# Patient Record
Sex: MALE | Race: BLACK OR AFRICAN AMERICAN | Employment: FULL TIME | URBAN - METROPOLITAN AREA
[De-identification: names, ages, dates, MRNs, and addresses within clinical notes are randomized per-mention and may not be internally consistent; named-entity substitution may affect disease eponyms.]

---

## 2017-01-18 ENCOUNTER — APPOINTMENT (OUTPATIENT)
Dept: GENERAL RADIOLOGY | Age: 42
End: 2017-01-18
Attending: FAMILY MEDICINE
Payer: COMMERCIAL

## 2017-01-18 ENCOUNTER — HOSPITAL ENCOUNTER (OUTPATIENT)
Age: 42
Discharge: HOME | End: 2017-01-18
Attending: FAMILY MEDICINE
Payer: COMMERCIAL

## 2017-01-18 VITALS
OXYGEN SATURATION: 99 % | RESPIRATION RATE: 18 BRPM | HEART RATE: 65 BPM | WEIGHT: 200 LBS | SYSTOLIC BLOOD PRESSURE: 142 MMHG | HEIGHT: 72 IN | TEMPERATURE: 99 F | BODY MASS INDEX: 27.09 KG/M2 | DIASTOLIC BLOOD PRESSURE: 63 MMHG

## 2017-01-18 DIAGNOSIS — M54.2 NECK PAIN: Primary | ICD-10-CM

## 2017-01-18 DIAGNOSIS — M50.30 DEGENERATIVE DISC DISEASE, CERVICAL: ICD-10-CM

## 2017-01-18 DIAGNOSIS — M54.10 RADICULOPATHY OF ARM: ICD-10-CM

## 2017-01-18 PROCEDURE — 99214 OFFICE O/P EST MOD 30 MIN: CPT

## 2017-01-18 PROCEDURE — 96372 THER/PROPH/DIAG INJ SC/IM: CPT

## 2017-01-18 PROCEDURE — 99204 OFFICE O/P NEW MOD 45 MIN: CPT

## 2017-01-18 PROCEDURE — 72050 X-RAY EXAM NECK SPINE 4/5VWS: CPT

## 2017-01-18 RX ORDER — CYCLOBENZAPRINE HCL 10 MG
10 TABLET ORAL NIGHTLY PRN
Qty: 10 TABLET | Refills: 0 | Status: SHIPPED | OUTPATIENT
Start: 2017-01-18 | End: 2017-01-28

## 2017-01-18 RX ORDER — KETOROLAC TROMETHAMINE 30 MG/ML
60 INJECTION, SOLUTION INTRAMUSCULAR; INTRAVENOUS ONCE
Status: COMPLETED | OUTPATIENT
Start: 2017-01-18 | End: 2017-01-18

## 2017-01-18 RX ORDER — PREDNISONE 10 MG/1
TABLET ORAL
Qty: 30 TABLET | Refills: 0 | Status: SHIPPED | OUTPATIENT
Start: 2017-01-18 | End: 2017-08-30 | Stop reason: ALTCHOICE

## 2017-01-18 RX ORDER — IBUPROFEN 200 MG
200 TABLET ORAL EVERY 6 HOURS PRN
COMMUNITY
End: 2020-10-28

## 2017-01-18 RX ORDER — HYDROCODONE BITARTRATE AND ACETAMINOPHEN 5; 325 MG/1; MG/1
1 TABLET ORAL EVERY 6 HOURS PRN
Qty: 20 TABLET | Refills: 0 | Status: SHIPPED | OUTPATIENT
Start: 2017-01-18 | End: 2020-10-28

## 2017-01-18 NOTE — ED PROVIDER NOTES
Patient Seen in: [facility]    History   Patient presents with:  Neck Pain    Stated Complaint: neck pain    HPI    This 51-year-old male presents to the office with complaint of left-sided neck pain which radiates into the left Pain. Take with food. Stop for GI distress. Drink more water to prevent constipation.        Family History   Problem Relation Age of Onset   • Diabetes Maternal Grandmother          Smoking Status: Current Every Day Smoker        Packs/Day: 0.50  Years: both hands. CMS is intact to both hands. SKIN: Warm and dry.       ED Course   Labs Reviewed - No data to display    Xr Cervical Spine (4views) (thi=12011)    1/18/2017  PROCEDURE:  XR CERVICAL SPINE (4VIEWS) (CPT=72050)  TECHNIQUE:  AP, lateral, obliques worsen-otherwise in one week for recheck and possible physical therapy      Medications Prescribed:  Current Discharge Medication List    START taking these medications    predniSONE 10 MG Oral Tab  Take prednisone 40 mg for 3 days, 30 mg for 3 days, 20 mg your doctor in 3 days for any new or worsening symptoms otherwise in 1 week for reassessment and possible evaluation for physical therapy.

## 2017-01-18 NOTE — ED INITIAL ASSESSMENT (HPI)
Patient presents to [facility] with complaint of left neck pain ache since Jan.1 that radiates down to his upper arm. Does not recall injuring it or changing pillow. Denies numbness or tingling. H/o right neck pain several years ago.

## 2017-11-07 ENCOUNTER — OFFICE VISIT (OUTPATIENT)
Dept: INTERNAL MEDICINE CLINIC | Facility: CLINIC | Age: 42
End: 2017-11-07

## 2017-11-07 ENCOUNTER — HOSPITAL ENCOUNTER (OUTPATIENT)
Dept: GENERAL RADIOLOGY | Age: 42
Discharge: HOME | End: 2017-11-07
Attending: INTERNAL MEDICINE
Payer: COMMERCIAL

## 2017-11-07 VITALS
TEMPERATURE: 98 F | OXYGEN SATURATION: 99 % | HEART RATE: 58 BPM | BODY MASS INDEX: 30 KG/M2 | RESPIRATION RATE: 16 BRPM | DIASTOLIC BLOOD PRESSURE: 60 MMHG | WEIGHT: 220 LBS | SYSTOLIC BLOOD PRESSURE: 110 MMHG

## 2017-11-07 DIAGNOSIS — S46.011A STRAIN OF RIGHT ROTATOR CUFF CAPSULE, INITIAL ENCOUNTER: Primary | ICD-10-CM

## 2017-11-07 DIAGNOSIS — S46.011A STRAIN OF RIGHT ROTATOR CUFF CAPSULE, INITIAL ENCOUNTER: ICD-10-CM

## 2017-11-07 PROCEDURE — 99213 OFFICE O/P EST LOW 20 MIN: CPT | Performed by: INTERNAL MEDICINE

## 2017-11-07 PROCEDURE — 73030 X-RAY EXAM OF SHOULDER: CPT | Performed by: INTERNAL MEDICINE

## 2017-11-07 NOTE — PROGRESS NOTES
Radha Dill  2/10/1975 is a 43year old male. Patient presents with:  Pain: Right shoulder pain      HPI:   Shoulder/Upper arm:   Shoulder pain  Right   Fall none. Direct trauma none   Previous injury none. Tingling/numbness none.    Weaknes Take 1 tablet (50 mg total) by mouth 2 (two) times daily. -     XR SHOULDER, COMPLETE (MIN 2 VIEWS), RIGHT (CPT=73030); Future        , Home therapy program 3-4 times daily for 6 weeks ,         Patient Instructions   Shoulder exercises given to patient.

## 2017-11-07 NOTE — PATIENT INSTRUCTIONS
Shoulder exercises given to patient.   Will try conservative taking for a few weeks if no better will need an MRI/physical therapy/local injection

## 2018-12-27 ENCOUNTER — OFFICE VISIT (OUTPATIENT)
Dept: INTERNAL MEDICINE CLINIC | Facility: CLINIC | Age: 43
End: 2018-12-27
Payer: COMMERCIAL

## 2018-12-27 VITALS
SYSTOLIC BLOOD PRESSURE: 126 MMHG | HEIGHT: 69 IN | RESPIRATION RATE: 16 BRPM | BODY MASS INDEX: 31.07 KG/M2 | HEART RATE: 90 BPM | DIASTOLIC BLOOD PRESSURE: 80 MMHG | TEMPERATURE: 99 F | OXYGEN SATURATION: 99 % | WEIGHT: 209.75 LBS

## 2018-12-27 DIAGNOSIS — M51.16 LUMBAR DISC DISEASE WITH RADICULOPATHY: Primary | ICD-10-CM

## 2018-12-27 PROCEDURE — 99213 OFFICE O/P EST LOW 20 MIN: CPT | Performed by: INTERNAL MEDICINE

## 2018-12-28 NOTE — PROGRESS NOTES
Jovany Rea  2/10/1975 is a 37year old male. Patient presents with:  Forms Completion      HPI:   Here for FMLA form    Current Outpatient Medications:  ibuprofen 200 MG Oral Tab Take 200 mg by mouth every 6 (six) hours as needed for Pain.  Dis

## 2018-12-31 ENCOUNTER — TELEPHONE (OUTPATIENT)
Dept: INTERNAL MEDICINE CLINIC | Facility: CLINIC | Age: 43
End: 2018-12-31

## 2018-12-31 NOTE — TELEPHONE ENCOUNTER
Received signed FMLA form from provider. Faxed to number provided on form ([phone]). Fax confirmation received. Left message for patient to call office back to notify him form filled out and faxed to his HR support as directed on form.    Copy pl

## 2019-11-26 ENCOUNTER — OFFICE VISIT (OUTPATIENT)
Dept: INTERNAL MEDICINE CLINIC | Facility: CLINIC | Age: 44
End: 2019-11-26
Payer: COMMERCIAL

## 2019-11-26 VITALS
BODY MASS INDEX: 28.96 KG/M2 | TEMPERATURE: 97 F | WEIGHT: 195.5 LBS | SYSTOLIC BLOOD PRESSURE: 110 MMHG | HEIGHT: 69 IN | DIASTOLIC BLOOD PRESSURE: 67 MMHG | RESPIRATION RATE: 12 BRPM | OXYGEN SATURATION: 99 % | HEART RATE: 63 BPM

## 2019-11-26 DIAGNOSIS — Z53.8 APPOINTMENT CANCELED BY HOSPITAL: Primary | ICD-10-CM

## 2019-11-27 NOTE — PROGRESS NOTES
Pt came in for [facility] paperwork, discussed cannot complete as I have never met this patient before. The patient will need to await Dr. Bora Knapp return. A letter was written for his employer to extend his submission deadline.  Pt aware and verbalizes Alejandra

## 2019-12-02 ENCOUNTER — TELEPHONE (OUTPATIENT)
Dept: INTERNAL MEDICINE CLINIC | Facility: CLINIC | Age: 44
End: 2019-12-02

## 2019-12-02 NOTE — TELEPHONE ENCOUNTER
Pt returned call - advised paperwork would be complete by Thursday.       Call pt when paperwork is complete, instructions to return included in paperwork    Appt for Dec 30 to complete paperwork canceled

## 2019-12-02 NOTE — TELEPHONE ENCOUNTER
Patient called regarding his FMLA forms being completed by Dr. Deb Simon. Phone call was disconnected.  Called patient back and left  advising him that Dr. Deb Simon has the forms and will complete them by Thursday as he just got back from being out of the co

## 2019-12-05 NOTE — TELEPHONE ENCOUNTER
Forms completed - LVM for pt to contact office - forms placed at  for . Per pts request - forms were faxed to the number provided on the instructions - [phone]    Copies made for pod folder and scan.

## 2019-12-12 NOTE — TELEPHONE ENCOUNTER
Patient came into office stating that there were some entries on his [place] paperwork that needed to be changed - upon reviewing his forms the pt made some changes that he wanted  to sign off on.  The changes were made per the pts request stating that his F

## 2019-12-12 NOTE — TELEPHONE ENCOUNTER
Patient calling to confirm beginning and end date and resent:    Beginning date:  12/06/19  End 12/5/20     Another portion not filled out needs to be completed please   Patient told by ?  Someone previously to come in and they will revise and resend this f

## 2019-12-13 NOTE — TELEPHONE ENCOUNTER
Patient's HR is informing him that we have information/doctor signature on \"wrong line' patient 3 way calling with me now and HR to give me specifics to redo and refax since this is ongoing so long.      Per HR:  They received redo on paperwork with [employer]

## 2020-04-09 ENCOUNTER — TELEPHONE (OUTPATIENT)
Dept: INTERNAL MEDICINE CLINIC | Facility: CLINIC | Age: 45
End: 2020-04-09

## 2020-04-09 DIAGNOSIS — L08.9 SKIN INFECTION: Primary | ICD-10-CM

## 2020-04-09 PROCEDURE — 99213 OFFICE O/P EST LOW 20 MIN: CPT | Performed by: INTERNAL MEDICINE

## 2020-04-09 RX ORDER — AMOXICILLIN AND CLAVULANATE POTASSIUM 875; 125 MG/1; MG/1
1 TABLET, FILM COATED ORAL 2 TIMES DAILY
Qty: 20 TABLET | Refills: 0 | Status: SHIPPED | OUTPATIENT
Start: 2020-04-09 | End: 2020-04-19

## 2020-04-09 NOTE — TELEPHONE ENCOUNTER
Virtual Telephone Check-In    Fior Mancera verbally consents to a Virtual/Telephone Check-In visit on 04/09/20. Patient understands and accepts financial responsibility for any deductible, co-insurance and/or co-pays associated with this service.

## 2020-04-09 NOTE — TELEPHONE ENCOUNTER
Pt called stating he has a painful boil, that might need to be drained.    - Insurance information confirmed/updated  - Patient informed that they may receive a call from a blocked/unavailable number.  - Patient aware that a charge may apply if call is conv

## 2020-10-28 ENCOUNTER — OFFICE VISIT (OUTPATIENT)
Dept: INTERNAL MEDICINE CLINIC | Facility: CLINIC | Age: 45
End: 2020-10-28
Payer: COMMERCIAL

## 2020-10-28 VITALS
WEIGHT: 183.81 LBS | BODY MASS INDEX: 27.22 KG/M2 | HEIGHT: 69 IN | TEMPERATURE: 98 F | DIASTOLIC BLOOD PRESSURE: 76 MMHG | SYSTOLIC BLOOD PRESSURE: 98 MMHG | HEART RATE: 104 BPM

## 2020-10-28 DIAGNOSIS — M25.511 ACUTE PAIN OF RIGHT SHOULDER: Primary | ICD-10-CM

## 2020-10-28 PROCEDURE — 96372 THER/PROPH/DIAG INJ SC/IM: CPT | Performed by: INTERNAL MEDICINE

## 2020-10-28 PROCEDURE — 3078F DIAST BP <80 MM HG: CPT | Performed by: INTERNAL MEDICINE

## 2020-10-28 PROCEDURE — 99214 OFFICE O/P EST MOD 30 MIN: CPT | Performed by: INTERNAL MEDICINE

## 2020-10-28 PROCEDURE — 3074F SYST BP LT 130 MM HG: CPT | Performed by: INTERNAL MEDICINE

## 2020-10-28 PROCEDURE — 3008F BODY MASS INDEX DOCD: CPT | Performed by: INTERNAL MEDICINE

## 2020-10-28 RX ORDER — HYDROCODONE BITARTRATE AND ACETAMINOPHEN 5; 325 MG/1; MG/1
1 TABLET ORAL EVERY 6 HOURS PRN
Qty: 20 TABLET | Refills: 0 | Status: SHIPPED | OUTPATIENT
Start: 2020-10-28 | End: 2021-01-13

## 2020-10-28 RX ORDER — LIDOCAINE 4 G/G
1 PATCH TOPICAL EVERY 24 HOURS
Qty: 10 PATCH | Refills: 0 | Status: ON HOLD | OUTPATIENT
Start: 2020-10-28 | End: 2021-01-29

## 2020-10-28 RX ORDER — IBUPROFEN 600 MG/1
600 TABLET ORAL EVERY 6 HOURS PRN
Qty: 30 TABLET | Refills: 0 | Status: SHIPPED | OUTPATIENT
Start: 2020-10-28 | End: 2020-12-07

## 2020-10-28 RX ORDER — BACLOFEN 10 MG/1
10 TABLET ORAL 2 TIMES DAILY
Qty: 30 TABLET | Refills: 0 | Status: ON HOLD | OUTPATIENT
Start: 2020-10-28 | End: 2021-01-29

## 2020-10-28 RX ORDER — KETOROLAC TROMETHAMINE 30 MG/ML
60 INJECTION, SOLUTION INTRAMUSCULAR; INTRAVENOUS ONCE
Status: COMPLETED | OUTPATIENT
Start: 2020-10-28 | End: 2020-10-28

## 2020-10-28 RX ADMIN — KETOROLAC TROMETHAMINE 60 MG: 30 INJECTION, SOLUTION INTRAMUSCULAR; INTRAVENOUS at 15:53:00

## 2020-10-28 NOTE — PATIENT INSTRUCTIONS
I have given you a strong ibuprofen injection in office. Do not take the oral ibuprofen for at least 10 hours. You may take norco occasionally for break through pain. I have ordered a muscle relaxer. It can cause drowsy.  If no improvement with twice a day

## 2020-10-28 NOTE — PROGRESS NOTES
Patient Office Visit    ASSESSMENT AND PLAN:   (M25.511) Acute pain of right shoulder  (primary encounter diagnosis)  Plan: HYDROcodone-acetaminophen (NORCO) 5-325 MG Oral        Tab, ibuprofen 600 MG Oral Tab, baclofen 10 MG         Oral Tab, Ketorolac Tr needed for Pain. • baclofen 10 MG Oral Tab 30 tablet 0     Sig: Take 1 tablet (10 mg total) by mouth 2 (two) times daily. • lidocaine (HM LIDOCAINE PATCH) 4 % External Patch 10 patch 0     Sig: Place 1 patch onto the skin daily.          Clare Mcclure 0.50      Smokeless tobacco: Never Used      Tobacco comment: 2-3 cigars daily    Alcohol use:  Yes      Alcohol/week: 0.0 standard drinks      Frequency: Monthly or less      Comment: occ    Drug use: No    Family History:  Family History   Problem Relatio

## 2020-11-04 ENCOUNTER — TELEPHONE (OUTPATIENT)
Dept: INTERNAL MEDICINE CLINIC | Facility: CLINIC | Age: 45
End: 2020-11-04

## 2020-11-06 ENCOUNTER — OFFICE VISIT (OUTPATIENT)
Dept: INTERNAL MEDICINE CLINIC | Facility: CLINIC | Age: 45
End: 2020-11-06
Payer: COMMERCIAL

## 2020-11-06 VITALS
DIASTOLIC BLOOD PRESSURE: 70 MMHG | TEMPERATURE: 99 F | RESPIRATION RATE: 16 BRPM | WEIGHT: 187.19 LBS | HEART RATE: 72 BPM | OXYGEN SATURATION: 97 % | SYSTOLIC BLOOD PRESSURE: 110 MMHG | HEIGHT: 69 IN | BODY MASS INDEX: 27.73 KG/M2

## 2020-11-06 DIAGNOSIS — M25.511 ACUTE PAIN OF RIGHT SHOULDER: Primary | ICD-10-CM

## 2020-11-06 PROCEDURE — 3078F DIAST BP <80 MM HG: CPT | Performed by: INTERNAL MEDICINE

## 2020-11-06 PROCEDURE — 3074F SYST BP LT 130 MM HG: CPT | Performed by: INTERNAL MEDICINE

## 2020-11-06 PROCEDURE — 99214 OFFICE O/P EST MOD 30 MIN: CPT | Performed by: INTERNAL MEDICINE

## 2020-11-06 PROCEDURE — 99072 ADDL SUPL MATRL&STAF TM PHE: CPT | Performed by: INTERNAL MEDICINE

## 2020-11-06 PROCEDURE — 3008F BODY MASS INDEX DOCD: CPT | Performed by: INTERNAL MEDICINE

## 2020-11-06 RX ORDER — PREDNISONE 20 MG/1
TABLET ORAL
Qty: 14 TABLET | Refills: 0 | Status: SHIPPED | OUTPATIENT
Start: 2020-11-06 | End: 2021-01-14

## 2020-11-06 NOTE — PATIENT INSTRUCTIONS
I have ordered prednisone. Take two tablets for 1 week. Do not take ibuprofen while taking prednisone.  Please follow up with our orthopedic department and PT for further evaluation

## 2020-11-06 NOTE — PROGRESS NOTES
Patient Office Visit    ASSESSMENT AND PLAN:   (M25.511) Acute pain of right shoulder  (primary encounter diagnosis)  Plan: ORTHOPEDIC - INTERNAL, OP REFERRAL TO [facility]         [facility], predniSONE 20 MG Oral        Tab, XR SHOULDER, COMPLE be filled     Past Medical History:   Diagnosis Date   • Flat foot    • Lumbar disc disease    • Pain in joint, hand    • Rotator cuff (capsule) sprain    • Syncope        Past Surgical History:   Procedure Laterality Date   • Angle Taylor Left     Dr Atkins Smoker visit.         REVIEW OF SYSTEMS   Constitutional: no fatigue normal energy no weight changes   HENT: normal sinuses and no mouth issues   Eyes: . normal vision no eye pain   Respiratory: normal respirations no cough   Cardiovascular: no CP, or palpitation

## 2020-11-09 ENCOUNTER — TELEPHONE (OUTPATIENT)
Dept: INTERNAL MEDICINE CLINIC | Facility: CLINIC | Age: 45
End: 2020-11-09

## 2020-11-09 NOTE — TELEPHONE ENCOUNTER
Patient is calling to check on the status of his paperwork he gave Dr. Maricarmen Barrow on 11/06. Patient stated that he needs the paperwork completed ASAP. Please advise.

## 2020-11-10 NOTE — TELEPHONE ENCOUNTER
Paperwork was already done and we gave it to him during his appointment.  We have a scanned copy if we need to fax it to his office    [address] [place] [address] DO

## 2020-11-10 NOTE — TELEPHONE ENCOUNTER
Faxed paperwork to roro No     Fax number [phone]     Fax confirmation received     Sent to scan and copy placed in la accordion     Called pt and informed of paperwork being faxed

## 2020-11-12 NOTE — TELEPHONE ENCOUNTER
Patient stated that his short term disability paperwork was faxed to the wrong number. Please refax to [phone] or [phone] case # [id number]. Patient needs this done ASAP.

## 2020-11-16 ENCOUNTER — TELEPHONE (OUTPATIENT)
Dept: INTERNAL MEDICINE CLINIC | Facility: CLINIC | Age: 45
End: 2020-11-16

## 2020-11-16 NOTE — TELEPHONE ENCOUNTER
Certificate of health care provider form filled out and signed by Dr. Dwaine Yang to [employer]     Fax number [phone]     Fax confritmation received     Original sent to scan   Copy placed in FMLA folder     Called pt to inquire if he wo

## 2020-11-19 ENCOUNTER — TELEPHONE (OUTPATIENT)
Dept: INTERNAL MEDICINE CLINIC | Facility: CLINIC | Age: 45
End: 2020-11-19

## 2020-11-19 ENCOUNTER — HOSPITAL ENCOUNTER (OUTPATIENT)
Dept: GENERAL RADIOLOGY | Age: 45
Discharge: HOME | End: 2020-11-19
Attending: INTERNAL MEDICINE
Payer: COMMERCIAL

## 2020-11-19 DIAGNOSIS — M25.511 ACUTE PAIN OF RIGHT SHOULDER: ICD-10-CM

## 2020-11-19 PROCEDURE — 73030 X-RAY EXAM OF SHOULDER: CPT | Performed by: INTERNAL MEDICINE

## 2020-11-19 NOTE — TELEPHONE ENCOUNTER
Forms received from the [employer]. , Claim Event Id: [id number], forms placed at dr. Kenya Zambrano folder for fup. Also med rec request sent to HIM/med rec dept to process med records request from [place].

## 2020-11-20 NOTE — PROGRESS NOTES
Dear RN, please let the patient know his x ray shows mild arthritis of the shoulder. No fractures. If symptoms persist, he should follow up with the orthopedic surgeon.  Ksenia Cevallos DO

## 2020-11-24 ENCOUNTER — OFFICE VISIT (OUTPATIENT)
Dept: ORTHOPEDICS CLINIC | Facility: CLINIC | Age: 45
End: 2020-11-24
Payer: COMMERCIAL

## 2020-11-24 VITALS — OXYGEN SATURATION: 99 % | RESPIRATION RATE: 16 BRPM | HEIGHT: 69 IN | BODY MASS INDEX: 28 KG/M2 | HEART RATE: 74 BPM

## 2020-11-24 DIAGNOSIS — M54.12 CERVICAL RADICULOPATHY: Primary | ICD-10-CM

## 2020-11-24 DIAGNOSIS — R20.2 NUMBNESS AND TINGLING OF RIGHT UPPER EXTREMITY: ICD-10-CM

## 2020-11-24 DIAGNOSIS — R20.0 NUMBNESS AND TINGLING OF RIGHT UPPER EXTREMITY: ICD-10-CM

## 2020-11-24 DIAGNOSIS — M89.8X1 PERISCAPULAR PAIN: ICD-10-CM

## 2020-11-24 PROCEDURE — 99203 OFFICE O/P NEW LOW 30 MIN: CPT | Performed by: FAMILY MEDICINE

## 2020-11-24 PROCEDURE — 99072 ADDL SUPL MATRL&STAF TM PHE: CPT | Performed by: FAMILY MEDICINE

## 2020-11-24 NOTE — PROGRESS NOTES
[facility] New Patient Note    CC: Patient presents with:  Arm or Hand Injury: Right complete arm pain 1 month       HPI: This 39year old male presents today with complaints of neck and posterior shoulder pain with radicular pain down the R arm that tablet 0   • HYDROcodone-acetaminophen (NORCO) 5-325 MG Oral Tab Take 1 tablet by mouth every 6 (six) hours as needed for Pain. Take with food. Stop for GI distress. Drink more water to prevent constipation.  20 tablet 0   • ibuprofen 600 MG Oral Tab Take 1 affect. Musculoskeletal: Evaluation of patient's cervical spine reveals mild lower cervical paraspinal tenderness with tenderness through the parascapular musculature.   He does have positive Spurling's with increased burning pain down his right upper ext

## 2020-11-25 ENCOUNTER — HOSPITAL ENCOUNTER (OUTPATIENT)
Dept: GENERAL RADIOLOGY | Age: 45
Discharge: HOME | End: 2020-11-25
Attending: FAMILY MEDICINE
Payer: COMMERCIAL

## 2020-11-25 DIAGNOSIS — M54.12 CERVICAL RADICULOPATHY: ICD-10-CM

## 2020-11-25 PROCEDURE — 72050 X-RAY EXAM NECK SPINE 4/5VWS: CPT | Performed by: FAMILY MEDICINE

## 2020-11-27 ENCOUNTER — TELEPHONE (OUTPATIENT)
Dept: INTERNAL MEDICINE CLINIC | Facility: CLINIC | Age: 45
End: 2020-11-27

## 2020-11-27 NOTE — TELEPHONE ENCOUNTER
Patient called requesting to speak with the nurse regarding paperwork that was filled out.  Stated that the paperwork was not completed properly

## 2020-11-30 ENCOUNTER — OFFICE VISIT (OUTPATIENT)
Dept: PHYSICAL THERAPY | Age: 45
End: 2020-11-30
Attending: INTERNAL MEDICINE
Payer: COMMERCIAL

## 2020-11-30 ENCOUNTER — TELEPHONE (OUTPATIENT)
Dept: PHYSICAL THERAPY | Age: 45
End: 2020-11-30

## 2020-11-30 DIAGNOSIS — M25.511 ACUTE PAIN OF RIGHT SHOULDER: ICD-10-CM

## 2020-11-30 PROCEDURE — 97162 PT EVAL MOD COMPLEX 30 MIN: CPT

## 2020-11-30 PROCEDURE — 97014 ELECTRIC STIMULATION THERAPY: CPT

## 2020-11-30 NOTE — TELEPHONE ENCOUNTER
Called pt to inquire about paperwork     Pt stated that he gave us the wrong paperwork to fill out   Pt stated that he will bring correct paperwork in for completion

## 2020-11-30 NOTE — PROGRESS NOTES
SHOULDER EVALUATION:   Referring Physician: Dr. Francisco Suarez  Diagnosis: Acute pain of right shoulder     Date of Service: 11/30/2020     PATIENT SUMMARY   Gerda Germain is a 39year old male who presents to therapy today with complaints of 5 weeks h/o right Therapy is medically necessary to address the above impairments and reach functional goals.      Precautions:  None  OBJECTIVE:   Observation/Posture: Guarded posture, pt holding R arm at his side, slightly forward head position  Palpation: Hypersensitivity and resume computer work  4.  Increase cervical spine rotation by 10 deg, neutral cervical spine position with less pain to enable pt to sleep with less disturbance    Frequency / Duration: Patient will be seen for 2 x/week or a total of 8 visits over a 90

## 2020-12-01 ENCOUNTER — HOSPITAL ENCOUNTER (OUTPATIENT)
Dept: MRI IMAGING | Age: 45
Discharge: HOME | End: 2020-12-01
Attending: FAMILY MEDICINE
Payer: COMMERCIAL

## 2020-12-01 DIAGNOSIS — M54.12 CERVICAL RADICULOPATHY: ICD-10-CM

## 2020-12-01 PROCEDURE — 72141 MRI NECK SPINE W/O DYE: CPT | Performed by: FAMILY MEDICINE

## 2020-12-02 ENCOUNTER — OFFICE VISIT (OUTPATIENT)
Dept: PHYSICAL THERAPY | Age: 45
End: 2020-12-02
Attending: INTERNAL MEDICINE
Payer: COMMERCIAL

## 2020-12-02 ENCOUNTER — TELEPHONE (OUTPATIENT)
Dept: INTERNAL MEDICINE CLINIC | Facility: CLINIC | Age: 45
End: 2020-12-02

## 2020-12-02 PROCEDURE — 97014 ELECTRIC STIMULATION THERAPY: CPT

## 2020-12-02 PROCEDURE — 97140 MANUAL THERAPY 1/> REGIONS: CPT

## 2020-12-02 PROCEDURE — 97110 THERAPEUTIC EXERCISES: CPT

## 2020-12-02 NOTE — TELEPHONE ENCOUNTER
Pt dropped off forms to be completed:    Intermittent Leave of Absence   Complete identical to last year but with current dates    Fax number to return to employer HR included in paperwork - Notify pt when complete to pickup copies    Paperwork placed in V

## 2020-12-02 NOTE — TELEPHONE ENCOUNTER
I had only approved it till 11/28. In the new paperwork, someone crossed off the previous dates and extended the dates to January. He needs to follow up with his primary care physician if he needs the date extended.     [facility]

## 2020-12-02 NOTE — TELEPHONE ENCOUNTER
Pt dropped off new set of paperwork to be completed. Fax number to return to pt employer HR included in paperwork.     Notify pt when complete and can  copy    Paperwork placed in Dr. Ankur Knapp folder in front office    **Pt also dropped off short term

## 2020-12-02 NOTE — TELEPHONE ENCOUNTER
I spoke to pt and informed message below. Pt states he needs extension through 12/9/2020. Per Dr Robby Brown advised to follow up with ortho/PCP for further completion of FMLA forms as he has already brought in multiple forms to be changed.    Pt has appt wit

## 2020-12-03 NOTE — PROGRESS NOTES
Diagnosis: Acute R shoulder pain, Cervical radiculopathy    Precautions:  Moderate-severe spinal stenosis  Insurance Type (# Auth): BCBS (8) Total Timed Treatment: 35 min      Total Treatment time: 50 min       Charges: EX 1, MT 1, IFC/CP    Treatment Numbe mobilizations for opening R C3-7, modalities for pain. Plan: Cervical spine traction, joint mobilizations, gentle ROM, modalities and pt education.

## 2020-12-07 ENCOUNTER — OFFICE VISIT (OUTPATIENT)
Dept: PHYSICAL THERAPY | Age: 45
End: 2020-12-07
Attending: INTERNAL MEDICINE
Payer: COMMERCIAL

## 2020-12-07 DIAGNOSIS — M25.511 ACUTE PAIN OF RIGHT SHOULDER: ICD-10-CM

## 2020-12-07 PROCEDURE — 97140 MANUAL THERAPY 1/> REGIONS: CPT

## 2020-12-07 PROCEDURE — 97014 ELECTRIC STIMULATION THERAPY: CPT

## 2020-12-07 PROCEDURE — 97012 MECHANICAL TRACTION THERAPY: CPT

## 2020-12-07 RX ORDER — IBUPROFEN 600 MG/1
600 TABLET ORAL EVERY 8 HOURS PRN
Qty: 90 TABLET | Refills: 0 | Status: SHIPPED | OUTPATIENT
Start: 2020-12-07 | End: 2020-12-08 | Stop reason: ALTCHOICE

## 2020-12-07 NOTE — TELEPHONE ENCOUNTER
Refilled signed. I'm not aware that we have received any [facility] paperwork. Can we check on that?

## 2020-12-07 NOTE — TELEPHONE ENCOUNTER
Took call from pt, he is requesting a refill on ibuprofen. States that Dr Fidencio Husain was the last to refill. Pharmacy up to date. Last refill 10/28/20. Patient has appt on 12/8/20 with Dr Aleksander Scott. He also states Dr Fidencio Husain faxed over [facility] paperwork to Dr. Aleksander Scott.

## 2020-12-08 ENCOUNTER — OFFICE VISIT (OUTPATIENT)
Dept: ORTHOPEDICS CLINIC | Facility: CLINIC | Age: 45
End: 2020-12-08
Payer: COMMERCIAL

## 2020-12-08 VITALS — OXYGEN SATURATION: 99 % | HEIGHT: 69 IN | HEART RATE: 70 BPM | RESPIRATION RATE: 16 BRPM | BODY MASS INDEX: 28 KG/M2

## 2020-12-08 DIAGNOSIS — M48.02 STENOSIS OF CERVICAL SPINE: ICD-10-CM

## 2020-12-08 DIAGNOSIS — R20.0 NUMBNESS AND TINGLING OF RIGHT UPPER EXTREMITY: ICD-10-CM

## 2020-12-08 DIAGNOSIS — R20.2 NUMBNESS AND TINGLING OF RIGHT UPPER EXTREMITY: ICD-10-CM

## 2020-12-08 DIAGNOSIS — M54.12 CERVICAL RADICULOPATHY: Primary | ICD-10-CM

## 2020-12-08 PROCEDURE — 99214 OFFICE O/P EST MOD 30 MIN: CPT | Performed by: FAMILY MEDICINE

## 2020-12-08 RX ORDER — GABAPENTIN 100 MG/1
100 CAPSULE ORAL 3 TIMES DAILY
Qty: 90 CAPSULE | Refills: 0 | Status: SHIPPED | OUTPATIENT
Start: 2020-12-08 | End: 2021-05-05

## 2020-12-09 ENCOUNTER — OFFICE VISIT (OUTPATIENT)
Dept: PHYSICAL THERAPY | Age: 45
End: 2020-12-09
Attending: INTERNAL MEDICINE
Payer: COMMERCIAL

## 2020-12-09 ENCOUNTER — TELEPHONE (OUTPATIENT)
Dept: ORTHOPEDICS CLINIC | Facility: CLINIC | Age: 45
End: 2020-12-09

## 2020-12-09 PROCEDURE — 97014 ELECTRIC STIMULATION THERAPY: CPT

## 2020-12-09 PROCEDURE — 97140 MANUAL THERAPY 1/> REGIONS: CPT

## 2020-12-09 PROCEDURE — 97012 MECHANICAL TRACTION THERAPY: CPT

## 2020-12-09 NOTE — TELEPHONE ENCOUNTER
Form completed by Dr. Milo Lawson. Copy made and placed and placed in purple accordion file. Call placed to patient to inform. Original placed at  for patient to .

## 2020-12-09 NOTE — PROGRESS NOTES
Diagnosis: Acute R shoulder pain, Cervical radiculopathy    Precautions:  Moderate-severe spinal stenosis  Insurance Type (# Auth): BCBS (8) Total Timed Treatment: 35 min      Total Treatment time: 50 min       Ch[facility] MT 1, IFC/CP    Treatm sleep with less disturbance    HEP: Supine L rotation, seated L side flexion, flexion  c spine. Use of CP/MHP to decrease pain  Education: Pt was educated on findings of the MRI, discussed benefit of PT at this time.  Pt awaiting neurosurgeon and neurologis

## 2020-12-09 NOTE — PROGRESS NOTES
Diagnosis: Acute R shoulder pain, Cervical radiculopathy    Precautions:  Moderate-severe spinal stenosis  Insurance Type (# Auth): BCBS (8) Total Timed Treatment: 35 min      Total Treatment time: 50 min       [facility], [facility]    Treatm disturbance    HEP: Supine L rotation, seated L side flexion, flexion  c spine. Use of CP/MHP to decrease pain  Education: Pt was educated on findings of the MRI, discussed benefit of PT at this time.  Follow up with MD, await neurosurgeon and neurologist o

## 2020-12-10 NOTE — TELEPHONE ENCOUNTER
Patient states form not received:    Please re fax:    Ale Fischer [phone]    [address] [facility]: [phone]

## 2020-12-11 NOTE — TELEPHONE ENCOUNTER
Forms re-faxed to number provided - fax confirmation received. Forms returned to [facility] folder.

## 2020-12-14 ENCOUNTER — OFFICE VISIT (OUTPATIENT)
Dept: PHYSICAL THERAPY | Age: 45
End: 2020-12-14
Attending: INTERNAL MEDICINE
Payer: COMMERCIAL

## 2020-12-14 PROCEDURE — 97140 MANUAL THERAPY 1/> REGIONS: CPT

## 2020-12-14 PROCEDURE — 97014 ELECTRIC STIMULATION THERAPY: CPT

## 2020-12-14 PROCEDURE — 97012 MECHANICAL TRACTION THERAPY: CPT

## 2020-12-14 NOTE — PROGRESS NOTES
Diagnosis: Acute R shoulder pain, Cervical radiculopathy    Precautions:  Moderate-severe spinal stenosis  Insurance Type (# Auth): BCBS (8) Total Timed Treatment: 35 min      Total Treatment time: 50 min       Ch[facility]es: [facility], MT 1, IFC/CP    Treatm c spine. Use of CP/MHP to decrease pain    Assessment: Cervical spine stenosis with radiculopathy C6/7.  Pt has made minimal improvement at this time he continues to experience numbness in the right hand, severe pain lateral cervical spine to hand lateral

## 2020-12-15 ENCOUNTER — OFFICE VISIT (OUTPATIENT)
Dept: INTERNAL MEDICINE CLINIC | Facility: CLINIC | Age: 45
End: 2020-12-15
Payer: COMMERCIAL

## 2020-12-15 VITALS
HEIGHT: 69 IN | WEIGHT: 190 LBS | TEMPERATURE: 98 F | SYSTOLIC BLOOD PRESSURE: 110 MMHG | BODY MASS INDEX: 28.14 KG/M2 | RESPIRATION RATE: 16 BRPM | DIASTOLIC BLOOD PRESSURE: 60 MMHG | OXYGEN SATURATION: 100 % | HEART RATE: 83 BPM

## 2020-12-15 DIAGNOSIS — M17.0 ARTHRITIS OF BOTH KNEES: Chronic | ICD-10-CM

## 2020-12-15 DIAGNOSIS — Z00.00 ROUTINE GENERAL MEDICAL EXAMINATION AT A HEALTH CARE FACILITY: Primary | ICD-10-CM

## 2020-12-15 DIAGNOSIS — M48.02 CERVICAL STENOSIS OF SPINE: ICD-10-CM

## 2020-12-15 DIAGNOSIS — M51.16 LUMBAR DISC DISEASE WITH RADICULOPATHY: ICD-10-CM

## 2020-12-15 PROCEDURE — 99396 PREV VISIT EST AGE 40-64: CPT | Performed by: INTERNAL MEDICINE

## 2020-12-15 PROCEDURE — 3074F SYST BP LT 130 MM HG: CPT | Performed by: INTERNAL MEDICINE

## 2020-12-15 PROCEDURE — 3078F DIAST BP <80 MM HG: CPT | Performed by: INTERNAL MEDICINE

## 2020-12-15 PROCEDURE — 3008F BODY MASS INDEX DOCD: CPT | Performed by: INTERNAL MEDICINE

## 2020-12-15 NOTE — PROGRESS NOTES
Galina Duenas  2/10/1975 is a 39year old male.   Patient presents with:  Physical      HPI:   Here for CPX  Current Outpatient Medications   Medication Sig Dispense Refill   • Diclofenac Sodium 50 MG Oral Tab EC Take 1 tablet (50 mg total) by mouth some neck stiffness or pain. While working on a punching bag. Had an MRI of the neck which showed the patient to have significant cervical stenosis.   Patient now has radiating pain down the right upper extremity with numbness involving the lateral 3 digi Insomnia none/no hx of sleep disorder. Memory loss none. EXAM:   /60   Pulse 83   Temp 97.9 °F (36.6 °C) (Oral)   Resp 16   Ht 5' 9\" (1.753 m)   Wt 190 lb (86.2 kg)   SpO2 100%   BMI 28.06 kg/m²     GENERAL:   Build: average .    HEENT:   Ear none.  Patient does have weakness in the right upper extremity secondary to pain  Sensory: all sensory modalities normal.  Except patient has some sensory deficits involving the dorsal lateral aspect of his right forearm and lateral 3 fingers  LYMPHATICS:

## 2020-12-15 NOTE — TELEPHONE ENCOUNTER
Pt was in today for OV and stated that [employer] still hadnt received [facility] paperwork - I personally spoke with Tylor Khan from [employer] and confirmed the dated that the paperwork was sent.    Forms were faxed with confirmation received and emailed to HR_leave_of_

## 2020-12-16 ENCOUNTER — OFFICE VISIT (OUTPATIENT)
Dept: PHYSICAL THERAPY | Age: 45
End: 2020-12-16
Attending: INTERNAL MEDICINE
Payer: COMMERCIAL

## 2020-12-16 PROCEDURE — 97012 MECHANICAL TRACTION THERAPY: CPT

## 2020-12-16 PROCEDURE — 97110 THERAPEUTIC EXERCISES: CPT

## 2020-12-16 PROCEDURE — 97014 ELECTRIC STIMULATION THERAPY: CPT

## 2020-12-17 NOTE — PROGRESS NOTES
Diagnosis: Acute R shoulder pain, Cervical radiculopathy    Precautions:  Moderate-severe spinal stenosis  Insurance Type (# Auth): BCBS (8) Total Timed Treatment: 35 min      Total Treatment time: 50 min       Charges: [facility] Traction, EX 1, IFC/MHP    Treat using home unit every other day 10 mins. L rotation, seated L side flexion, flexion  c spine. Use of CP/MHP to decrease pain    Assessment: Cervical spine stenosis with radiculopathy C6/7.  Pt has made minimal improvement at this time he continues to experi

## 2020-12-18 ENCOUNTER — LABORATORY ENCOUNTER (OUTPATIENT)
Dept: LAB | Age: 45
End: 2020-12-18
Attending: INTERNAL MEDICINE
Payer: COMMERCIAL

## 2020-12-18 DIAGNOSIS — Z00.00 ROUTINE GENERAL MEDICAL EXAMINATION AT A HEALTH CARE FACILITY: ICD-10-CM

## 2020-12-18 PROCEDURE — 80053 COMPREHEN METABOLIC PANEL: CPT

## 2020-12-18 PROCEDURE — 84443 ASSAY THYROID STIM HORMONE: CPT

## 2020-12-18 PROCEDURE — 36415 COLL VENOUS BLD VENIPUNCTURE: CPT

## 2020-12-18 PROCEDURE — 85025 COMPLETE CBC W/AUTO DIFF WBC: CPT

## 2020-12-18 PROCEDURE — 84436 ASSAY OF TOTAL THYROXINE: CPT

## 2020-12-18 PROCEDURE — 84153 ASSAY OF PSA TOTAL: CPT

## 2020-12-18 PROCEDURE — 81001 URINALYSIS AUTO W/SCOPE: CPT

## 2020-12-18 PROCEDURE — 80061 LIPID PANEL: CPT

## 2020-12-18 PROCEDURE — 83036 HEMOGLOBIN GLYCOSYLATED A1C: CPT

## 2020-12-21 ENCOUNTER — TELEPHONE (OUTPATIENT)
Dept: ORTHOPEDICS CLINIC | Facility: CLINIC | Age: 45
End: 2020-12-21

## 2020-12-21 NOTE — TELEPHONE ENCOUNTER
Papers were completed and faxed on 12/8. Is this what he is referring to? I have not received anything new.

## 2020-12-21 NOTE — TELEPHONE ENCOUNTER
Patient is checking on when the STD ppwk that was faxed from [facility] will be completed. Please give him a call with an update ASAP.

## 2020-12-23 ENCOUNTER — TELEPHONE (OUTPATIENT)
Dept: ORTHOPEDICS CLINIC | Facility: CLINIC | Age: 45
End: 2020-12-23

## 2020-12-23 NOTE — TELEPHONE ENCOUNTER
Patient is following up to see if the disability [facility]. Please give him a call with an update ASAP.

## 2020-12-29 ENCOUNTER — TELEPHONE (OUTPATIENT)
Dept: ORTHOPEDICS CLINIC | Facility: CLINIC | Age: 45
End: 2020-12-29

## 2020-12-30 ENCOUNTER — APPOINTMENT (OUTPATIENT)
Dept: PHYSICAL THERAPY | Age: 45
End: 2020-12-30
Attending: INTERNAL MEDICINE
Payer: COMMERCIAL

## 2020-12-30 ENCOUNTER — OFFICE VISIT (OUTPATIENT)
Dept: SURGERY | Facility: CLINIC | Age: 45
End: 2020-12-30
Payer: COMMERCIAL

## 2020-12-30 ENCOUNTER — TELEPHONE (OUTPATIENT)
Dept: PHYSICAL THERAPY | Age: 45
End: 2020-12-30

## 2020-12-30 VITALS
WEIGHT: 185 LBS | HEART RATE: 63 BPM | BODY MASS INDEX: 25.06 KG/M2 | SYSTOLIC BLOOD PRESSURE: 112 MMHG | DIASTOLIC BLOOD PRESSURE: 74 MMHG | HEIGHT: 72 IN

## 2020-12-30 DIAGNOSIS — M54.12 CERVICAL RADICULOPATHY: Primary | ICD-10-CM

## 2020-12-30 DIAGNOSIS — G95.9 CERVICAL MYELOPATHY (HCC): ICD-10-CM

## 2020-12-30 PROCEDURE — 3074F SYST BP LT 130 MM HG: CPT | Performed by: NEUROLOGICAL SURGERY

## 2020-12-30 PROCEDURE — 99202 OFFICE O/P NEW SF 15 MIN: CPT | Performed by: NEUROLOGICAL SURGERY

## 2020-12-30 PROCEDURE — 3008F BODY MASS INDEX DOCD: CPT | Performed by: NEUROLOGICAL SURGERY

## 2020-12-30 PROCEDURE — 3078F DIAST BP <80 MM HG: CPT | Performed by: NEUROLOGICAL SURGERY

## 2020-12-30 NOTE — PROGRESS NOTES
Patient here for consult, referred by Dr. Geronimo Alfaro for cervical radiculopathy. Patient states biggest concern is constant numbness in right hand since end of October after working out with heavy bag. Also with loss of feeling in hand.  Patient with intermit

## 2020-12-30 NOTE — H&P
[facility]  Neurosurgery Consult    REASON FOR CONSULTATION:  Right arm pain    HISTORY OF PRESENT ILLNESS:  Cielo Adams is a(n) 39year old male with a history of neck and back pain for years.  More recently, in October, he was pu that he does not use drugs. ALLERGIES:  No Known Allergies    MEDICATIONS:  (Not in a hospital admission)    No current facility-administered medications for this visit. REVIEW OF SYSTEMS:  A 10-point system was reviewed.   Pertinent positives and treatment    Lewis Bey MD  Neurological Surgeon  [facility]  [address], [address] [facility]  [address] [facility], [address]

## 2020-12-30 NOTE — H&P (VIEW-ONLY)
[facility]  Neurosurgery Consult    REASON FOR CONSULTATION:  Right arm pain    HISTORY OF PRESENT ILLNESS:  Nancy Chapman is a(n) 39year old male with a history of neck and back pain for years.  More recently, in October, he was pu that he does not use drugs. ALLERGIES:  No Known Allergies    MEDICATIONS:  (Not in a hospital admission)    No current facility-administered medications for this visit. REVIEW OF SYSTEMS:  A 10-point system was reviewed.   Pertinent positives and treatment    Bridget Ramos MD  Neurological Surgeon  [facility]  [address], [address] [facility], [address]

## 2021-01-07 ENCOUNTER — TELEPHONE (OUTPATIENT)
Dept: INTERNAL MEDICINE CLINIC | Facility: CLINIC | Age: 46
End: 2021-01-07

## 2021-01-07 NOTE — TELEPHONE ENCOUNTER
Patient called back and stated that he spoke with Dr. Mariya Golden assistant on 12/15 regarding his FMLA forms being completed for [employer]. He was told that confirmation was received.  He spoke with his HR department and he was told that they never rece

## 2021-01-08 ENCOUNTER — TELEPHONE (OUTPATIENT)
Dept: SURGERY | Facility: CLINIC | Age: 46
End: 2021-01-08

## 2021-01-08 NOTE — TELEPHONE ENCOUNTER
Rcvd fax from the [facility] requesting Attending physician's statement to be completed. Endorsed to provider for completion.

## 2021-01-12 NOTE — TELEPHONE ENCOUNTER
Initiated forms. Patient has pre-surgical discussion with Dr. Delon Siddiqui tomorrow 1-13. Placed in my personal disability folder.

## 2021-01-12 NOTE — TELEPHONE ENCOUNTER
Called pt and informedhim that forms had been faxed and scanned to [facility]   Pt states that he will call again and verify     Informed pt that we gave him a copy of everything faxed and that he should bring these paperwork to [employer] diectly if possibl

## 2021-01-12 NOTE — TELEPHONE ENCOUNTER
LVM for pt to give office a call back   All forms were faxed to [employer] [facility] department

## 2021-01-13 ENCOUNTER — TELEPHONE (OUTPATIENT)
Dept: SURGERY | Facility: CLINIC | Age: 46
End: 2021-01-13

## 2021-01-13 ENCOUNTER — OFFICE VISIT (OUTPATIENT)
Dept: SURGERY | Facility: CLINIC | Age: 46
End: 2021-01-13
Payer: COMMERCIAL

## 2021-01-13 VITALS
HEIGHT: 72 IN | SYSTOLIC BLOOD PRESSURE: 120 MMHG | WEIGHT: 185 LBS | BODY MASS INDEX: 25.06 KG/M2 | DIASTOLIC BLOOD PRESSURE: 80 MMHG | HEART RATE: 59 BPM

## 2021-01-13 DIAGNOSIS — G95.9 CERVICAL MYELOPATHY (HCC): ICD-10-CM

## 2021-01-13 DIAGNOSIS — M54.12 CERVICAL RADICULOPATHY: Primary | ICD-10-CM

## 2021-01-13 PROCEDURE — 3079F DIAST BP 80-89 MM HG: CPT | Performed by: NEUROLOGICAL SURGERY

## 2021-01-13 PROCEDURE — 99212 OFFICE O/P EST SF 10 MIN: CPT | Performed by: NEUROLOGICAL SURGERY

## 2021-01-13 PROCEDURE — 3008F BODY MASS INDEX DOCD: CPT | Performed by: NEUROLOGICAL SURGERY

## 2021-01-13 PROCEDURE — 3074F SYST BP LT 130 MM HG: CPT | Performed by: NEUROLOGICAL SURGERY

## 2021-01-13 RX ORDER — HYDROCODONE BITARTRATE AND ACETAMINOPHEN 10; 325 MG/1; MG/1
1-2 TABLET ORAL EVERY 6 HOURS PRN
Qty: 50 TABLET | Refills: 0 | Status: ON HOLD | OUTPATIENT
Start: 2021-01-13 | End: 2021-01-30

## 2021-01-13 NOTE — PATIENT INSTRUCTIONS
You are scheduled for ACDF 1 level on 1- at 9:00 am with Dr. Robson Duncan. Post operative appointments are made 2 weeks and 6 weeks post-op. Your post op appointments are TBD. · PCP clearance is needed.   We have faxed a clearance request to  scheduled as 1 night admission. · The hospital will contact you 1-2 days before surgery with your arrival time. · You may need an Aspen cervical collar. · You may need an external bone growth stimulator.  If ordered for your surgery [employer] rep- [employer].)    [facility]'s current visitor policy  · Patients are now allowed two designated support persons to help during hospital stay. ·  -Support persons must be 25years of age or older.   ·  -Visiting hours are 5 am- 8 pm (no one will be allo least once every 3 months. · In the event that your preferred pharmacy does not have the requested medication in stock (e.g. Backordered), it is your responsibility to find another pharmacy that has the requested medication available.   We will gladly send Thank you for your understanding in the matter. Paperwork Completion:  • If you have paperwork that needs to be completed by the doctor, please present it to the  when you arrive. Not all paperwork can be completed during your office visit.

## 2021-01-13 NOTE — TELEPHONE ENCOUNTER
You are scheduled for ACDF 1 level on 1- with Dr. Mary White. Post operative appointments are made 2 weeks and 6 weeks post-op. Your post op appointments are TBD      · PCP clearance is needed.   We have faxed a clearance request to Dr. Julius Chaudhari 's o night admission. · The hospital will contact you 1-2 days before surgery with your arrival time. · You may need an Aspen cervical collar. · You may need an external bone growth stimulator.  If ordered for your surgery ERNESTINE Whittington will contact y detergents.)    [facility] current visitor [facility]  · Patients are now allowed two designated support persons to help during hospital stay. ·  -Support persons must be 25years of age or older.   ·  -Visiting hours are 5 am- 8 pm (no one will be allo

## 2021-01-13 NOTE — TELEPHONE ENCOUNTER
Patient is scheduled for anterior cervical five-six discectomy and fusion  on 1- with Dr Kurtis Lee.   Surgery order signed x  Placed sx on surgery sheet x  Placed on outlook calendar  x  Faxed pre-op clearance request to PCP Dr Kari Funes  x   Routed to

## 2021-01-13 NOTE — PROGRESS NOTES
[facility]  Neurosurgery Clinic Visit      HISTORY OF PRESENT ILLNESS:  Lennie Rock is a(n) 39year old male here for follow-up to review imaging and discuss surgical plan.  He has continued severe pain in his right arm and hand, a year old male in no acute distress.   Exam otherwise deferred for surgical discussion    DIAGNOSTIC DATA:      IMAGING:  MRI cervical with degenerative changes, advanced spondylosis for patient's age, disc bulges at C3-4, 4-5, 5-6, 6-7 with moderate to kenan

## 2021-01-13 NOTE — TELEPHONE ENCOUNTER
Patient seen in office today. FMLA forms partially completed per OV notes. Placed on Dr. Nigel Marley desbob for review and signature.

## 2021-01-14 ENCOUNTER — TELEPHONE (OUTPATIENT)
Dept: INTERNAL MEDICINE CLINIC | Facility: CLINIC | Age: 46
End: 2021-01-14

## 2021-01-14 RX ORDER — ACETAMINOPHEN 500 MG
1000 TABLET ORAL ONCE
Status: CANCELLED | OUTPATIENT
Start: 2021-01-14 | End: 2021-01-14

## 2021-01-14 NOTE — TELEPHONE ENCOUNTER
Received completed [facility] paperwork. Faxed to [facility], confirmation received. Endorsed to  staff to scan. Copy made and mailed to patient.

## 2021-01-14 NOTE — TELEPHONE ENCOUNTER
Received preop paperwork and scheduled pt for OV -   Pre-Admin will be placing all necessary orders - Clearance letter is to be faxed to Pre-Admin testing at [phone] as well as to the Pre Admin department at [phone].     Form placed in [facility] med pod

## 2021-01-14 NOTE — TELEPHONE ENCOUNTER
[employer] [facility] [employer] [place] [employer]. I spoke with Kaiden Quinonez. Call reference #[id number].  Time on call 27:21    Prior authorization request completed for: Anterior C5-6 discectomy and fusion  Authorization #NO AUTHORIZATION REQUIRED  Authorization dates: 1

## 2021-01-15 NOTE — TELEPHONE ENCOUNTER
vd fax from [place] [id number] requesting Certification of Yovani Quan be completed by provider. Endorsed to provider for completion.

## 2021-01-19 ENCOUNTER — OFFICE VISIT (OUTPATIENT)
Dept: INTERNAL MEDICINE CLINIC | Facility: CLINIC | Age: 46
End: 2021-01-19
Payer: COMMERCIAL

## 2021-01-19 VITALS
BODY MASS INDEX: 27.7 KG/M2 | WEIGHT: 187 LBS | HEART RATE: 76 BPM | HEIGHT: 69 IN | SYSTOLIC BLOOD PRESSURE: 90 MMHG | RESPIRATION RATE: 16 BRPM | OXYGEN SATURATION: 100 % | DIASTOLIC BLOOD PRESSURE: 64 MMHG

## 2021-01-19 DIAGNOSIS — Z01.818 PRE-OP EXAMINATION: Primary | ICD-10-CM

## 2021-01-19 DIAGNOSIS — M48.02 CERVICAL STENOSIS OF SPINE: ICD-10-CM

## 2021-01-19 PROCEDURE — 3074F SYST BP LT 130 MM HG: CPT | Performed by: NURSE PRACTITIONER

## 2021-01-19 PROCEDURE — 99214 OFFICE O/P EST MOD 30 MIN: CPT | Performed by: NURSE PRACTITIONER

## 2021-01-19 PROCEDURE — 3008F BODY MASS INDEX DOCD: CPT | Performed by: NURSE PRACTITIONER

## 2021-01-19 PROCEDURE — 3078F DIAST BP <80 MM HG: CPT | Performed by: NURSE PRACTITIONER

## 2021-01-19 NOTE — PROGRESS NOTES
CC: Preop exam.    Marcus John is a 39year old male who presents for a pre-operative physical exam  By Dr. Abdelrahman mahoney. Patient is to have anterior cervical five-cervical six discectomy and fusion,secondary to cervical radiculopathy to be on Irina Oliver Right     Dr Brenton Banuelos   • LUMBAR EPIDURAL N/A 1/21/2020    Performed by Denzel Canales MD at [address] 12/10/2019    Performed by Denzel Canales MD at [address] [facility] surgery.

## 2021-01-20 NOTE — TELEPHONE ENCOUNTER
Per PCP NERY Gardenr: Faizan Strauss is a 39year old male who presents for a pre-operative physical exam. Labs ordered. RCRI calculated risk 0 points, 3.9%. patient is approved for surgery. \"    Nothing further needed for upcoming chris

## 2021-01-21 NOTE — TELEPHONE ENCOUNTER
Paperwork faxed to Ashu Hernández  at [phone]. Confirmation received. Copy made and mailed to patient. Copy sent to  staff for scanning.

## 2021-01-23 ENCOUNTER — LAB ENCOUNTER (OUTPATIENT)
Dept: LAB | Age: 46
End: 2021-01-23
Attending: INTERNAL MEDICINE
Payer: COMMERCIAL

## 2021-01-23 DIAGNOSIS — Z01.818 PRE-OP EXAMINATION: ICD-10-CM

## 2021-01-23 LAB
ALBUMIN SERPL-MCNC: 4.1 G/DL (ref 3.4–5)
ALBUMIN/GLOB SERPL: 1.4 {RATIO} (ref 1–2)
ALP LIVER SERPL-CCNC: 62 U/L
ALT SERPL-CCNC: 18 U/L
ANION GAP SERPL CALC-SCNC: 0 MMOL/L (ref 0–18)
ANTIBODY SCREEN: NEGATIVE
APTT PPP: 28.8 SECONDS (ref 25.4–36.1)
AST SERPL-CCNC: 13 U/L (ref 15–37)
BASOPHILS # BLD AUTO: 0.06 X10(3) UL (ref 0–0.2)
BASOPHILS NFR BLD AUTO: 0.8 %
BILIRUB SERPL-MCNC: 0.5 MG/DL (ref 0.1–2)
BUN BLD-MCNC: 13 MG/DL (ref 7–18)
BUN/CREAT SERPL: 12.3 (ref 10–20)
CALCIUM BLD-MCNC: 9.3 MG/DL (ref 8.5–10.1)
CHLORIDE SERPL-SCNC: 112 MMOL/L (ref 98–112)
CO2 SERPL-SCNC: 30 MMOL/L (ref 21–32)
CREAT BLD-MCNC: 1.06 MG/DL
DEPRECATED RDW RBC AUTO: 49.3 FL (ref 35.1–46.3)
EOSINOPHIL # BLD AUTO: 0.44 X10(3) UL (ref 0–0.7)
EOSINOPHIL NFR BLD AUTO: 6.2 %
ERYTHROCYTE [DISTWIDTH] IN BLOOD BY AUTOMATED COUNT: 13.3 % (ref 11–15)
GLOBULIN PLAS-MCNC: 3 G/DL (ref 2.8–4.4)
GLUCOSE BLD-MCNC: 87 MG/DL (ref 70–99)
HCT VFR BLD AUTO: 47.4 %
HGB BLD-MCNC: 15.5 G/DL
IMM GRANULOCYTES # BLD AUTO: 0.03 X10(3) UL (ref 0–1)
IMM GRANULOCYTES NFR BLD: 0.4 %
INR BLD: 0.91 (ref 0.89–1.11)
LYMPHOCYTES # BLD AUTO: 1.87 X10(3) UL (ref 1–4)
LYMPHOCYTES NFR BLD AUTO: 26.4 %
M PROTEIN MFR SERPL ELPH: 7.1 G/DL (ref 6.4–8.2)
MCH RBC QN AUTO: 32.2 PG (ref 26–34)
MCHC RBC AUTO-ENTMCNC: 32.7 G/DL (ref 31–37)
MCV RBC AUTO: 98.3 FL
MONOCYTES # BLD AUTO: 0.47 X10(3) UL (ref 0.1–1)
MONOCYTES NFR BLD AUTO: 6.6 %
NEUTROPHILS # BLD AUTO: 4.22 X10 (3) UL (ref 1.5–7.7)
NEUTROPHILS # BLD AUTO: 4.22 X10(3) UL (ref 1.5–7.7)
NEUTROPHILS NFR BLD AUTO: 59.6 %
OSMOLALITY SERPL CALC.SUM OF ELEC: 293 MOSM/KG (ref 275–295)
PATIENT FASTING Y/N/NP: YES
PLATELET # BLD AUTO: 170 10(3)UL (ref 150–450)
POTASSIUM SERPL-SCNC: 4.3 MMOL/L (ref 3.5–5.1)
PSA SERPL DL<=0.01 NG/ML-MCNC: 12.5 SECONDS (ref 12.4–14.6)
RBC # BLD AUTO: 4.82 X10(6)UL
RH BLOOD TYPE: POSITIVE
SODIUM SERPL-SCNC: 142 MMOL/L (ref 136–145)
WBC # BLD AUTO: 7.1 X10(3) UL (ref 4–11)

## 2021-01-23 PROCEDURE — 87081 CULTURE SCREEN ONLY: CPT

## 2021-01-23 PROCEDURE — 85610 PROTHROMBIN TIME: CPT

## 2021-01-23 PROCEDURE — 36415 COLL VENOUS BLD VENIPUNCTURE: CPT

## 2021-01-23 PROCEDURE — 85025 COMPLETE CBC W/AUTO DIFF WBC: CPT

## 2021-01-23 PROCEDURE — 80053 COMPREHEN METABOLIC PANEL: CPT

## 2021-01-23 PROCEDURE — 86900 BLOOD TYPING SEROLOGIC ABO: CPT

## 2021-01-23 PROCEDURE — 85730 THROMBOPLASTIN TIME PARTIAL: CPT

## 2021-01-23 PROCEDURE — 86850 RBC ANTIBODY SCREEN: CPT

## 2021-01-23 PROCEDURE — 86901 BLOOD TYPING SEROLOGIC RH(D): CPT

## 2021-01-26 ENCOUNTER — LAB ENCOUNTER (OUTPATIENT)
Dept: LAB | Age: 46
End: 2021-01-26
Attending: NEUROLOGICAL SURGERY
Payer: COMMERCIAL

## 2021-01-26 DIAGNOSIS — M54.12 CERVICAL RADICULOPATHY: ICD-10-CM

## 2021-01-27 LAB — SARS-COV-2 RNA RESP QL NAA+PROBE: NOT DETECTED

## 2021-01-29 ENCOUNTER — APPOINTMENT (OUTPATIENT)
Dept: CV DIAGNOSTICS | Facility: HOSPITAL | Age: 46
End: 2021-01-29
Attending: INTERNAL MEDICINE
Payer: COMMERCIAL

## 2021-01-29 ENCOUNTER — APPOINTMENT (OUTPATIENT)
Dept: GENERAL RADIOLOGY | Facility: HOSPITAL | Age: 46
End: 2021-01-29
Attending: NEUROLOGICAL SURGERY
Payer: COMMERCIAL

## 2021-01-29 ENCOUNTER — ANESTHESIA EVENT (OUTPATIENT)
Dept: SURGERY | Facility: HOSPITAL | Age: 46
End: 2021-01-29
Payer: COMMERCIAL

## 2021-01-29 ENCOUNTER — ANESTHESIA (OUTPATIENT)
Dept: SURGERY | Facility: HOSPITAL | Age: 46
End: 2021-01-29
Payer: COMMERCIAL

## 2021-01-29 ENCOUNTER — HOSPITAL ENCOUNTER (OUTPATIENT)
Facility: HOSPITAL | Age: 46
Discharge: HOME | End: 2021-01-30
Attending: NEUROLOGICAL SURGERY | Admitting: NEUROLOGICAL SURGERY
Payer: COMMERCIAL

## 2021-01-29 DIAGNOSIS — M54.12 CERVICAL RADICULOPATHY: Primary | ICD-10-CM

## 2021-01-29 LAB
ATRIAL RATE: 79 BPM
P AXIS: 49 DEGREES
P-R INTERVAL: 128 MS
Q-T INTERVAL: 404 MS
QRS DURATION: 80 MS
QTC CALCULATION (BEZET): 463 MS
R AXIS: 23 DEGREES
T AXIS: 193 DEGREES
TROPONIN I SERPL-MCNC: <0.045 NG/ML (ref ?–0.04)
VENTRICULAR RATE: 79 BPM

## 2021-01-29 PROCEDURE — 93306 TTE W/DOPPLER COMPLETE: CPT | Performed by: INTERNAL MEDICINE

## 2021-01-29 PROCEDURE — 99242 OFF/OP CONSLTJ NEW/EST SF 20: CPT | Performed by: INTERNAL MEDICINE

## 2021-01-29 PROCEDURE — 0RB30ZZ EXCISION OF CERVICAL VERTEBRAL DISC, OPEN APPROACH: ICD-10-PCS | Performed by: NEUROLOGICAL SURGERY

## 2021-01-29 PROCEDURE — 3078F DIAST BP <80 MM HG: CPT | Performed by: INTERNAL MEDICINE

## 2021-01-29 PROCEDURE — 4A11X4G MONITORING OF PERIPHERAL NERVOUS ELECTRICAL ACTIVITY, INTRAOPERATIVE, EXTERNAL APPROACH: ICD-10-PCS | Performed by: OTHER

## 2021-01-29 PROCEDURE — 0RG10A0 FUSION OF CERVICAL VERTEBRAL JOINT WITH INTERBODY FUSION DEVICE, ANTERIOR APPROACH, ANTERIOR COLUMN, OPEN APPROACH: ICD-10-PCS | Performed by: NEUROLOGICAL SURGERY

## 2021-01-29 PROCEDURE — 76000 FLUOROSCOPY <1 HR PHYS/QHP: CPT | Performed by: NEUROLOGICAL SURGERY

## 2021-01-29 PROCEDURE — 3008F BODY MASS INDEX DOCD: CPT | Performed by: INTERNAL MEDICINE

## 2021-01-29 PROCEDURE — 00NW0ZZ RELEASE CERVICAL SPINAL CORD, OPEN APPROACH: ICD-10-PCS | Performed by: NEUROLOGICAL SURGERY

## 2021-01-29 PROCEDURE — 3074F SYST BP LT 130 MM HG: CPT | Performed by: INTERNAL MEDICINE

## 2021-01-29 DEVICE — ONE-LEVEL ANTERIOR PLATE
Type: IMPLANTABLE DEVICE | Site: NECK | Status: FUNCTIONAL
Brand: AVIATOR

## 2021-01-29 DEVICE — BIO DBM GEL
Type: IMPLANTABLE DEVICE | Site: NECK | Status: FUNCTIONAL
Brand: BIO DBM

## 2021-01-29 DEVICE — VARIABLE, SELF-DRILLING SCREW
Type: IMPLANTABLE DEVICE | Site: NECK | Status: FUNCTIONAL
Brand: AVIATOR

## 2021-01-29 RX ORDER — DIPHENHYDRAMINE HCL 25 MG
25 CAPSULE ORAL EVERY 4 HOURS PRN
Status: DISCONTINUED | OUTPATIENT
Start: 2021-01-29 | End: 2021-01-30

## 2021-01-29 RX ORDER — SENNOSIDES 8.6 MG
17.2 TABLET ORAL NIGHTLY
Status: DISCONTINUED | OUTPATIENT
Start: 2021-01-29 | End: 2021-01-30

## 2021-01-29 RX ORDER — BISACODYL 10 MG
10 SUPPOSITORY, RECTAL RECTAL
Status: DISCONTINUED | OUTPATIENT
Start: 2021-01-29 | End: 2021-01-30

## 2021-01-29 RX ORDER — HYDROMORPHONE HYDROCHLORIDE 1 MG/ML
0.2 INJECTION, SOLUTION INTRAMUSCULAR; INTRAVENOUS; SUBCUTANEOUS EVERY 2 HOUR PRN
Status: DISCONTINUED | OUTPATIENT
Start: 2021-01-29 | End: 2021-01-30

## 2021-01-29 RX ORDER — HYDROCODONE BITARTRATE AND ACETAMINOPHEN 5; 325 MG/1; MG/1
2 TABLET ORAL AS NEEDED
Status: DISCONTINUED | OUTPATIENT
Start: 2021-01-29 | End: 2021-01-29 | Stop reason: HOSPADM

## 2021-01-29 RX ORDER — ACETAMINOPHEN 325 MG/1
650 TABLET ORAL EVERY 4 HOURS PRN
Status: DISCONTINUED | OUTPATIENT
Start: 2021-01-29 | End: 2021-01-30

## 2021-01-29 RX ORDER — MEPERIDINE HYDROCHLORIDE 25 MG/ML
12.5 INJECTION INTRAMUSCULAR; INTRAVENOUS; SUBCUTANEOUS AS NEEDED
Status: DISCONTINUED | OUTPATIENT
Start: 2021-01-29 | End: 2021-01-29 | Stop reason: HOSPADM

## 2021-01-29 RX ORDER — ONDANSETRON 2 MG/ML
INJECTION INTRAMUSCULAR; INTRAVENOUS AS NEEDED
Status: DISCONTINUED | OUTPATIENT
Start: 2021-01-29 | End: 2021-01-29 | Stop reason: SURG

## 2021-01-29 RX ORDER — MIDAZOLAM HYDROCHLORIDE 1 MG/ML
INJECTION INTRAMUSCULAR; INTRAVENOUS
Status: COMPLETED
Start: 2021-01-29 | End: 2021-01-29

## 2021-01-29 RX ORDER — DIPHENHYDRAMINE HYDROCHLORIDE 50 MG/ML
25 INJECTION INTRAMUSCULAR; INTRAVENOUS EVERY 4 HOURS PRN
Status: DISCONTINUED | OUTPATIENT
Start: 2021-01-29 | End: 2021-01-30

## 2021-01-29 RX ORDER — SODIUM CHLORIDE 9 MG/ML
INJECTION, SOLUTION INTRAVENOUS CONTINUOUS
Status: DISCONTINUED | OUTPATIENT
Start: 2021-01-29 | End: 2021-01-30

## 2021-01-29 RX ORDER — HYDROMORPHONE HYDROCHLORIDE 1 MG/ML
INJECTION, SOLUTION INTRAMUSCULAR; INTRAVENOUS; SUBCUTANEOUS
Status: COMPLETED
Start: 2021-01-29 | End: 2021-01-29

## 2021-01-29 RX ORDER — HYDROCODONE BITARTRATE AND ACETAMINOPHEN 5; 325 MG/1; MG/1
1 TABLET ORAL AS NEEDED
Status: DISCONTINUED | OUTPATIENT
Start: 2021-01-29 | End: 2021-01-29 | Stop reason: HOSPADM

## 2021-01-29 RX ORDER — NALOXONE HYDROCHLORIDE 0.4 MG/ML
80 INJECTION, SOLUTION INTRAMUSCULAR; INTRAVENOUS; SUBCUTANEOUS AS NEEDED
Status: DISCONTINUED | OUTPATIENT
Start: 2021-01-29 | End: 2021-01-29 | Stop reason: HOSPADM

## 2021-01-29 RX ORDER — SODIUM PHOSPHATE, DIBASIC AND SODIUM PHOSPHATE, MONOBASIC 7; 19 G/133ML; G/133ML
1 ENEMA RECTAL ONCE AS NEEDED
Status: DISCONTINUED | OUTPATIENT
Start: 2021-01-29 | End: 2021-01-30

## 2021-01-29 RX ORDER — HYDROCODONE BITARTRATE AND ACETAMINOPHEN 10; 325 MG/1; MG/1
2 TABLET ORAL EVERY 4 HOURS PRN
Status: DISCONTINUED | OUTPATIENT
Start: 2021-01-29 | End: 2021-01-30

## 2021-01-29 RX ORDER — MIDAZOLAM HYDROCHLORIDE 1 MG/ML
1 INJECTION INTRAMUSCULAR; INTRAVENOUS EVERY 5 MIN PRN
Status: DISCONTINUED | OUTPATIENT
Start: 2021-01-29 | End: 2021-01-29 | Stop reason: HOSPADM

## 2021-01-29 RX ORDER — HYDROMORPHONE HYDROCHLORIDE 1 MG/ML
0.4 INJECTION, SOLUTION INTRAMUSCULAR; INTRAVENOUS; SUBCUTANEOUS EVERY 2 HOUR PRN
Status: DISCONTINUED | OUTPATIENT
Start: 2021-01-29 | End: 2021-01-30

## 2021-01-29 RX ORDER — DOCUSATE SODIUM 100 MG/1
100 CAPSULE, LIQUID FILLED ORAL 2 TIMES DAILY
Status: DISCONTINUED | OUTPATIENT
Start: 2021-01-29 | End: 2021-01-30

## 2021-01-29 RX ORDER — GABAPENTIN 100 MG/1
100 CAPSULE ORAL 3 TIMES DAILY
Status: DISCONTINUED | OUTPATIENT
Start: 2021-01-29 | End: 2021-01-30

## 2021-01-29 RX ORDER — BUPIVACAINE HYDROCHLORIDE AND EPINEPHRINE 5; 5 MG/ML; UG/ML
INJECTION, SOLUTION EPIDURAL; INTRACAUDAL; PERINEURAL AS NEEDED
Status: DISCONTINUED | OUTPATIENT
Start: 2021-01-29 | End: 2021-01-29

## 2021-01-29 RX ORDER — CEFAZOLIN SODIUM/WATER 2 G/20 ML
2 SYRINGE (ML) INTRAVENOUS ONCE
Status: COMPLETED | OUTPATIENT
Start: 2021-01-29 | End: 2021-01-29

## 2021-01-29 RX ORDER — DIPHENHYDRAMINE HYDROCHLORIDE 50 MG/ML
12.5 INJECTION INTRAMUSCULAR; INTRAVENOUS AS NEEDED
Status: DISCONTINUED | OUTPATIENT
Start: 2021-01-29 | End: 2021-01-29 | Stop reason: HOSPADM

## 2021-01-29 RX ORDER — ONDANSETRON 2 MG/ML
4 INJECTION INTRAMUSCULAR; INTRAVENOUS AS NEEDED
Status: DISCONTINUED | OUTPATIENT
Start: 2021-01-29 | End: 2021-01-29 | Stop reason: HOSPADM

## 2021-01-29 RX ORDER — METOCLOPRAMIDE HYDROCHLORIDE 5 MG/ML
10 INJECTION INTRAMUSCULAR; INTRAVENOUS AS NEEDED
Status: DISCONTINUED | OUTPATIENT
Start: 2021-01-29 | End: 2021-01-29 | Stop reason: HOSPADM

## 2021-01-29 RX ORDER — SODIUM CHLORIDE 9 MG/ML
INJECTION, SOLUTION INTRAVENOUS CONTINUOUS PRN
Status: DISCONTINUED | OUTPATIENT
Start: 2021-01-29 | End: 2021-01-29 | Stop reason: SURG

## 2021-01-29 RX ORDER — LIDOCAINE HYDROCHLORIDE 10 MG/ML
INJECTION, SOLUTION EPIDURAL; INFILTRATION; INTRACAUDAL; PERINEURAL AS NEEDED
Status: DISCONTINUED | OUTPATIENT
Start: 2021-01-29 | End: 2021-01-29 | Stop reason: SURG

## 2021-01-29 RX ORDER — DEXAMETHASONE SODIUM PHOSPHATE 4 MG/ML
VIAL (ML) INJECTION AS NEEDED
Status: DISCONTINUED | OUTPATIENT
Start: 2021-01-29 | End: 2021-01-29 | Stop reason: SURG

## 2021-01-29 RX ORDER — POLYETHYLENE GLYCOL 3350 17 G/17G
17 POWDER, FOR SOLUTION ORAL DAILY PRN
Status: DISCONTINUED | OUTPATIENT
Start: 2021-01-29 | End: 2021-01-30

## 2021-01-29 RX ORDER — PROCHLORPERAZINE EDISYLATE 5 MG/ML
10 INJECTION INTRAMUSCULAR; INTRAVENOUS EVERY 6 HOURS PRN
Status: DISCONTINUED | OUTPATIENT
Start: 2021-01-29 | End: 2021-01-30

## 2021-01-29 RX ORDER — HYDROMORPHONE HYDROCHLORIDE 1 MG/ML
0.4 INJECTION, SOLUTION INTRAMUSCULAR; INTRAVENOUS; SUBCUTANEOUS EVERY 5 MIN PRN
Status: DISCONTINUED | OUTPATIENT
Start: 2021-01-29 | End: 2021-01-29 | Stop reason: HOSPADM

## 2021-01-29 RX ORDER — DIAZEPAM 5 MG/1
5 TABLET ORAL EVERY 6 HOURS PRN
Status: DISCONTINUED | OUTPATIENT
Start: 2021-01-29 | End: 2021-01-30

## 2021-01-29 RX ORDER — CEFAZOLIN SODIUM/WATER 2 G/20 ML
2 SYRINGE (ML) INTRAVENOUS EVERY 8 HOURS
Status: COMPLETED | OUTPATIENT
Start: 2021-01-29 | End: 2021-01-29

## 2021-01-29 RX ORDER — BACITRACIN 50000 [USP'U]/1
INJECTION, POWDER, LYOPHILIZED, FOR SOLUTION INTRAMUSCULAR AS NEEDED
Status: DISCONTINUED | OUTPATIENT
Start: 2021-01-29 | End: 2021-01-29

## 2021-01-29 RX ORDER — SODIUM CHLORIDE, SODIUM LACTATE, POTASSIUM CHLORIDE, CALCIUM CHLORIDE 600; 310; 30; 20 MG/100ML; MG/100ML; MG/100ML; MG/100ML
INJECTION, SOLUTION INTRAVENOUS CONTINUOUS
Status: DISCONTINUED | OUTPATIENT
Start: 2021-01-29 | End: 2021-01-30

## 2021-01-29 RX ORDER — HYDROCODONE BITARTRATE AND ACETAMINOPHEN 10; 325 MG/1; MG/1
1 TABLET ORAL EVERY 4 HOURS PRN
Status: DISCONTINUED | OUTPATIENT
Start: 2021-01-29 | End: 2021-01-30

## 2021-01-29 RX ORDER — SODIUM CHLORIDE, SODIUM LACTATE, POTASSIUM CHLORIDE, CALCIUM CHLORIDE 600; 310; 30; 20 MG/100ML; MG/100ML; MG/100ML; MG/100ML
INJECTION, SOLUTION INTRAVENOUS CONTINUOUS
Status: DISCONTINUED | OUTPATIENT
Start: 2021-01-29 | End: 2021-01-29 | Stop reason: HOSPADM

## 2021-01-29 RX ORDER — HYDROMORPHONE HYDROCHLORIDE 1 MG/ML
0.8 INJECTION, SOLUTION INTRAMUSCULAR; INTRAVENOUS; SUBCUTANEOUS EVERY 2 HOUR PRN
Status: DISCONTINUED | OUTPATIENT
Start: 2021-01-29 | End: 2021-01-30

## 2021-01-29 RX ORDER — CYCLOBENZAPRINE HCL 5 MG
5 TABLET ORAL 3 TIMES DAILY
Status: DISCONTINUED | OUTPATIENT
Start: 2021-01-29 | End: 2021-01-30

## 2021-01-29 RX ORDER — ONDANSETRON 2 MG/ML
4 INJECTION INTRAMUSCULAR; INTRAVENOUS EVERY 4 HOURS PRN
Status: ACTIVE | OUTPATIENT
Start: 2021-01-29 | End: 2021-01-30

## 2021-01-29 RX ADMIN — ONDANSETRON 4 MG: 2 INJECTION INTRAMUSCULAR; INTRAVENOUS at 09:48:00

## 2021-01-29 RX ADMIN — CEFAZOLIN SODIUM/WATER 2 G: 2 G/20 ML SYRINGE (ML) INTRAVENOUS at 09:30:00

## 2021-01-29 RX ADMIN — SODIUM CHLORIDE: 9 INJECTION, SOLUTION INTRAVENOUS at 11:48:00

## 2021-01-29 RX ADMIN — LIDOCAINE HYDROCHLORIDE 50 MG: 10 INJECTION, SOLUTION EPIDURAL; INFILTRATION; INTRACAUDAL; PERINEURAL at 09:16:00

## 2021-01-29 RX ADMIN — DEXAMETHASONE SODIUM PHOSPHATE 12 MG: 4 MG/ML VIAL (ML) INJECTION at 09:46:00

## 2021-01-29 RX ADMIN — SODIUM CHLORIDE: 9 INJECTION, SOLUTION INTRAVENOUS at 09:20:00

## 2021-01-29 NOTE — ANESTHESIA PROCEDURE NOTES
Peripheral IV  Date/Time: 1/29/2021 9:22 AM  Inserted by:  Ashley Green MD    Placement  Needle size: 18 G  Laterality: right  Location: forearm  Local anesthetic: none  Site prep: alcohol  Technique: anatomical landmarks  Attempts: 1

## 2021-01-29 NOTE — OPERATIVE REPORT
[facility]    OPERATIVE REPORT    Patient:  Humza Spring;  YOB: 1975     CSN:  [id number]; Medical Record Number:  [id number]    Admission Date:  1/29/2021 Operation Date:  1/29/2021    . ..........................     Operating Phys Sharp dissection was carried out in a bloodless plane medial to sternocleidomastoid muscle and carotid sheath and lateral to respiratory apparatus and esophagus down to the deep midline prevertebral fascia.  The prevertebral fascia was incised sharply and p central canal well decompressed, the exiting nerves were identified, The uncovertebral joints were either drilled or removed with Kerrison rongeurs to provide wide foraminotomies over the exiting nerve roots to and beyond the pedicles into the far lateral acceptable position on imaging and by visual inspection was accomplished the closure was initiated. The closure included generous irrigation down the lateral aspects of the bone grafts to ensure no bleeding from the epidural space.  Retractors were remov

## 2021-01-29 NOTE — ANESTHESIA PREPROCEDURE EVALUATION
PRE-OP EVALUATION    Patient Name: Marcus John    Pre-op Diagnosis: Cervical radiculopathy [M54.12]    Procedure(s):  anterior cervical five-cervical six discectomy and fusion      Surgeon(s) and Role:     Merlinda Aline, MD - Primary    Pre-op vit frequency: Never      Comment: occ      Drug use: No     Available pre-op labs reviewed.   Lab Results   Component Value Date    WBC 7.1 01/23/2021    RBC 4.82 01/23/2021    HGB 15.5 01/23/2021    HCT 47.4 01/23/2021    MCV 98.3 01/23/2021    MCH 32.2 01/23

## 2021-01-29 NOTE — ANESTHESIA PROCEDURE NOTES
Airway  Date/Time: 1/29/2021 9:18 AM  Urgency: elective    Airway not difficult    General Information and Staff    Patient location during procedure: OR  Anesthesiologist: Martina Luciano MD  Performed: anesthesiologist     Indications and Patient Cond

## 2021-01-29 NOTE — CONSULTS
[facility] Cardiology Consultation    [place] Plan Patient Status:  Outpatient in a Bed    2/10/1975 MRN [id number]   [facility] SURGERY Attending Alina Schulte MD   Hosp Day # 0 PCP Fly Hewitt MD     Reason for Consultation:  Abnormal used smokeless tobacco. He reports current alcohol use. He reports that he does not use drugs. Review of Systems:  All systems were reviewed and are negative except as described above in HPI.     Physical Exam:      Temp:  [98 °F (36.7 °C)-98.4 °F (36.9

## 2021-01-29 NOTE — PROGRESS NOTES
Anesthesia follow up:    S/p ACDF under GETA. No intraop complications. Patient noted to have ST depression on monitor upon arrival in PACU - different waveform than was seen in OR. VSS. Patient awake, alert, denies CP/SOB.   Only notes posterior neck pa

## 2021-01-29 NOTE — PLAN OF CARE
Pt admitted from PACU via bed. Pt on room air. IS encouraged. SCDs to BLE. 2D echo done per cardiology. Incision to anterior neck with coverlet drsg C/D/I. Soft cervical collar on and in place. Pt passed dysphagia screen. Tolerating general diet.  Denies na

## 2021-01-29 NOTE — ANESTHESIA POSTPROCEDURE EVALUATION
[address] Patient Status:  Outpatient in a Bed   Age/Gender 39year old male MRN [id number]   [facility] SURGERY Attending Mayte Baker MD   Hosp Day # 0 PCP Alyce Santana MD       Anesthesia Post-op Note    Proc

## 2021-01-29 NOTE — CONSULTS
[facility]ITAL[facility]  CONSULT     Kin[place] Plan Patient Status:  Outpatient in a Bed    2/10/1975 MRN [id number]   [facility] 3SW-A Attending Alina Schulte MD   Hosp Day # 0 PCP Fly Hewitt MD     Reason for consult: medical manage •  HYDROcodone-acetaminophen  MG Oral Tab, Take 1-2 tablets by mouth every 6 (six) hours as needed for Pain., Disp: 50 tablet, Rfl: 0    •  Diclofenac Sodium 50 MG Oral Tab EC, Take 1 tablet (50 mg total) by mouth 2 (two) times daily as needed. , Epic.      ASSESSMENT / PLAN:     1. S/p anterior cervical discectomy and fusion  POD 0  1. Pain control   2. PTOT   3. Per surgery   2. Tobacco dependence   1.  Nicotine patch offered- pt declined  3. neuropathy    Quality:  · DVT Prophylaxis: SCD  · CODE

## 2021-01-29 NOTE — INTERVAL H&P NOTE
Pre-op Diagnosis: Cervical radiculopathy [M54.12]    The above referenced H&P was reviewed by Vivian Stanford PA-C on 1/29/2021, the patient was examined and no significant changes have occurred in the patient's condition since the H&P was performed.   I di yes

## 2021-01-30 ENCOUNTER — APPOINTMENT (OUTPATIENT)
Dept: GENERAL RADIOLOGY | Facility: HOSPITAL | Age: 46
End: 2021-01-30
Attending: NEUROLOGICAL SURGERY
Payer: COMMERCIAL

## 2021-01-30 VITALS
WEIGHT: 188.94 LBS | HEIGHT: 72 IN | BODY MASS INDEX: 25.59 KG/M2 | SYSTOLIC BLOOD PRESSURE: 116 MMHG | HEART RATE: 70 BPM | RESPIRATION RATE: 16 BRPM | TEMPERATURE: 98 F | OXYGEN SATURATION: 98 % | DIASTOLIC BLOOD PRESSURE: 62 MMHG

## 2021-01-30 PROCEDURE — 72040 X-RAY EXAM NECK SPINE 2-3 VW: CPT | Performed by: NEUROLOGICAL SURGERY

## 2021-01-30 RX ORDER — CYCLOBENZAPRINE HCL 5 MG
5 TABLET ORAL 3 TIMES DAILY PRN
Qty: 30 TABLET | Refills: 0 | Status: SHIPPED | OUTPATIENT
Start: 2021-01-30 | End: 2021-02-10

## 2021-01-30 RX ORDER — HYDROCODONE BITARTRATE AND ACETAMINOPHEN 10; 325 MG/1; MG/1
1-2 TABLET ORAL EVERY 4 HOURS PRN
Qty: 60 TABLET | Refills: 0 | Status: SHIPPED | OUTPATIENT
Start: 2021-01-30 | End: 2021-02-10

## 2021-01-30 NOTE — PROGRESS NOTES
[facility]  Neurosurgery Progress Note    Selwyn Dan Patient Status:  Outpatient in a Bed    2/10/1975 MRN [id number]   [facility] 3SW-A Attending Nina Reyes MD   Hosp Day # 0 PCP Brad Ng MD     Chief Complaint:  S/p

## 2021-01-30 NOTE — PLAN OF CARE
PT AOX4, VSS on RA. , IS. Passing dysphagia screening. Some complaints of incisional pain, sore throat. Incision to anterior neck, coverlet CDI, soft cervical collar in place for comfort. Taking prn po pain meds with good relief. Voiding freely.  Plan fo

## 2021-01-30 NOTE — PHYSICAL THERAPY NOTE
PHYSICAL THERAPY QUICK EVALUATION - INPATIENT    Room Number: 378/378-A  Evaluation Date: 1/30/2021  Presenting Problem: C5-C6 ACDF       Problem List  Active Problems:    Cervical radiculopathy    Tobacco dependence      Past Medical History  Past Medic NEUROLOGICAL FINDINGS                      ACTIVITY TOLERANCE                         O2 WALK                  AM-PAC '6-Clicks' INPATIENT SHORT FORM - BASIC MOBILITY  How much difficulty does the patient currently have. ..  -   Turning over in bed (inc in bed; patient verbalized understanding to all education provided. Patient was encouraged to write questions for surgeon prior to his appointment as patient had questions regarding returning to sports like basketball.     Patient End of Session: Up in [place]

## 2021-01-30 NOTE — PROGRESS NOTES
[facility] Cardiology Progress Note        Alexander Sarabjitelizabeth Patient Status:  Outpatient in a Bed    2/10/1975 MRN [id number]   [facility] 3SW-A Attending Praveena De Leon MD   Hosp Day # 0 PCP Elvina Bence, MD     Sub ischemia. No h/o HTN, so rule out hypertrophic cardiomyopathy. I do not hear an outflow tract murmur. Echo shows mild LVH with hyperdynamic EF. 2. Post cervical fusion 1/29/21. 3. Smoker           Plan:  No CV worries.   He reports his EKG has been [place]

## 2021-01-30 NOTE — PLAN OF CARE
Pt a&o x 4. Cheerful & cooperative w/ care  Maintaining sats on ra  Voice slightly hoarse  Respirations easy, no distress. Tolerating midline.   Surgical dressing cdi,  soft collar in place  Voiding w/out difficulty  States norco provides adequate pain c limitations  - Instruct pt to call for assistance with activity based on assessment  - Modify environment to reduce risk of injury  - Provide assistive devices as appropriate  - Consider OT/PT consult to assist with strengthening/mobility  - Encourage toil

## 2021-01-30 NOTE — OCCUPATIONAL THERAPY NOTE
OCCUPATIONAL THERAPY QUICK EVALUATION - INPATIENT    Room Number: 378/378-A  Evaluation Date: 1/30/2021     Type of Evaluation: Quick Eval  Presenting Problem: s/p C5-6 ACDF 1/29/21    Physician Order: Omid Cater Consult to Occupational Therapy  Reason for Therapy functional mobility without device prior to admission.     SUBJECTIVE  \"There's still some numbness and tinging in my R hand, but it's better\"    OBJECTIVE  Precautions: Spine(soft collar for comfort)  Fall Risk: Standard fall risk    WEIGHT BEARING RESTR Mod I for balance in standing; UB dressing Mod I; education on toileting and toilet transfer techniques, performed transfer with Mod I assist to standard height toilet with light use of grab bar required (reports has counter in same location at home), toil Complexity  LOW     OT Discharge Recommendations: Home  OT Device Recommendations: None    PLAN   Patient has been evaluated and presents with no skilled Occupational Therapy needs at this time. Patient discharged from Occupational Therapy services.   Tena

## 2021-01-30 NOTE — PROGRESS NOTES
Pt discharged home. Discharge instructions given to pt including instructions to  rx's for flexeril & norco at his [facility] pharmacy, review of home meds, diet, hydration, activity, wound care & f/u care.   Verbalized understanding of all instruct

## 2021-02-02 ENCOUNTER — TELEPHONE (OUTPATIENT)
Dept: SURGERY | Facility: CLINIC | Age: 46
End: 2021-02-02

## 2021-02-05 NOTE — TELEPHONE ENCOUNTER
pt wife returning phone call, asking if he can remove the bandage or if they need to wait until check up, sx date ws 1.29.21

## 2021-02-05 NOTE — TELEPHONE ENCOUNTER
S: Spoke with patient's wife who called inquiring about incisional site and how to treat it. B: Patient underwent surgery on 1/29/21:  anterior cervical five-six discectomy and fusion with Dr. Hanh Richards.      A:  Discussed with patient's wife Yenny Bauman how

## 2021-02-10 ENCOUNTER — TELEPHONE (OUTPATIENT)
Dept: SURGERY | Facility: CLINIC | Age: 46
End: 2021-02-10

## 2021-02-10 ENCOUNTER — HOSPITAL ENCOUNTER (OUTPATIENT)
Dept: ULTRASOUND IMAGING | Age: 46
Discharge: HOME | End: 2021-02-10
Attending: PHYSICIAN ASSISTANT
Payer: COMMERCIAL

## 2021-02-10 ENCOUNTER — OFFICE VISIT (OUTPATIENT)
Dept: SURGERY | Facility: CLINIC | Age: 46
End: 2021-02-10
Payer: COMMERCIAL

## 2021-02-10 VITALS — DIASTOLIC BLOOD PRESSURE: 70 MMHG | SYSTOLIC BLOOD PRESSURE: 100 MMHG | HEART RATE: 68 BPM

## 2021-02-10 DIAGNOSIS — M79.89 RIGHT LEG SWELLING: ICD-10-CM

## 2021-02-10 DIAGNOSIS — Z98.1 S/P CERVICAL SPINAL FUSION: Primary | ICD-10-CM

## 2021-02-10 PROCEDURE — 93971 EXTREMITY STUDY: CPT | Performed by: PHYSICIAN ASSISTANT

## 2021-02-10 PROCEDURE — 3078F DIAST BP <80 MM HG: CPT | Performed by: PHYSICIAN ASSISTANT

## 2021-02-10 PROCEDURE — 3074F SYST BP LT 130 MM HG: CPT | Performed by: PHYSICIAN ASSISTANT

## 2021-02-10 PROCEDURE — 99024 POSTOP FOLLOW-UP VISIT: CPT | Performed by: PHYSICIAN ASSISTANT

## 2021-02-10 RX ORDER — HYDROCODONE BITARTRATE AND ACETAMINOPHEN 10; 325 MG/1; MG/1
1 TABLET ORAL EVERY 8 HOURS PRN
Qty: 40 TABLET | Refills: 0 | Status: SHIPPED | OUTPATIENT
Start: 2021-02-10 | End: 2021-06-01

## 2021-02-10 RX ORDER — CYCLOBENZAPRINE HCL 5 MG
5 TABLET ORAL 3 TIMES DAILY PRN
Qty: 30 TABLET | Refills: 0 | Status: SHIPPED | OUTPATIENT
Start: 2021-02-10 | End: 2021-08-03

## 2021-02-10 NOTE — TELEPHONE ENCOUNTER
Per ALDAIR Griffin-noted right leg swelling today at 2 week post op office visit. Provider has placed an order for \"urgent\" US of the leg. Called CS and spoke with Ilan Warren who confirmed order and insurance approval for US.     Patient scheduled to have

## 2021-02-11 NOTE — TELEPHONE ENCOUNTER
Form for Healthcare Provider Family Member's Serious Health Condition initiated    Form placed on Dr. Gemma Stevens desk for review     Routed to provider

## 2021-02-12 ENCOUNTER — TELEPHONE (OUTPATIENT)
Dept: SURGERY | Facility: CLINIC | Age: 46
End: 2021-02-12

## 2021-02-12 NOTE — TELEPHONE ENCOUNTER
Patient's wife states the blood clot in patient's leg is very painful and he cannot walk. She states the patient does not believe the swelling has increased. He was told to elevate and use heat.   She is wondering if there is anything else he can take for p

## 2021-02-12 NOTE — TELEPHONE ENCOUNTER
Spoke to Dr Mary White, he recommends patient go to the ED for evaluation if the pain is that severe. He is concerned it may be getting worse. Advised patient's wife. She will take him today.

## 2021-02-12 NOTE — TELEPHONE ENCOUNTER
S:  Spoke with patient's wife Batsheva Lacy who stated that:  -patient is keeping his leg elevated.  -patient is still having trouble walking and is experiencing pain in calf.  -patient is using compression stocking.  -patient is taking Norco, cyclobenzaprine and

## 2021-02-17 NOTE — TELEPHONE ENCOUNTER
[place]    Called Pt's wife Shamrin Armando to confirm that she would like the forms faxed and the original form mailed to pt's address     Forms faxed to [facility] as requested   #[phone]    Fax confi

## 2021-02-18 ENCOUNTER — HOSPITAL ENCOUNTER (OUTPATIENT)
Dept: ULTRASOUND IMAGING | Age: 46
Discharge: HOME | End: 2021-02-18
Attending: PHYSICIAN ASSISTANT
Payer: COMMERCIAL

## 2021-02-18 ENCOUNTER — TELEPHONE (OUTPATIENT)
Dept: SURGERY | Facility: CLINIC | Age: 46
End: 2021-02-18

## 2021-02-18 ENCOUNTER — HOSPITAL ENCOUNTER (OUTPATIENT)
Dept: GENERAL RADIOLOGY | Age: 46
Discharge: HOME | End: 2021-02-18
Attending: PHYSICIAN ASSISTANT
Payer: COMMERCIAL

## 2021-02-18 DIAGNOSIS — Z98.1 S/P CERVICAL SPINAL FUSION: ICD-10-CM

## 2021-02-18 DIAGNOSIS — M79.89 RIGHT LEG SWELLING: ICD-10-CM

## 2021-02-18 DIAGNOSIS — M79.89 RIGHT LEG SWELLING: Primary | ICD-10-CM

## 2021-02-18 DIAGNOSIS — Z86.718 HISTORY OF DEEP VENOUS THROMBOSIS (DVT) OF DISTAL VEIN OF RIGHT LOWER EXTREMITY: ICD-10-CM

## 2021-02-18 PROCEDURE — 72040 X-RAY EXAM NECK SPINE 2-3 VW: CPT | Performed by: PHYSICIAN ASSISTANT

## 2021-02-18 PROCEDURE — 93971 EXTREMITY STUDY: CPT | Performed by: PHYSICIAN ASSISTANT

## 2021-02-18 NOTE — TELEPHONE ENCOUNTER
Patient completed cervical xray and Venous doppler today. Results available in Epic. Patient has f/u appt scheduled for 3/24/2021. Will forward on to  and ALDAIR Hanks to advise on imaging and plan.

## 2021-02-18 NOTE — TELEPHONE ENCOUNTER
Spoke with patient who called back inquiring about his ability to use hair/beard oil, as he was asked to stop using the treatment oil prior to surgery. Discussed options to keep beard hair from incision site.  Patient thanked me for the call and the call w

## 2021-02-18 NOTE — TELEPHONE ENCOUNTER
I reviewed imaging with Dr. Lalit Goldstein. Xray is stable. Ultrasound is improved but still some residual clot. Dr. Lalit Goldstein recommends continuing to monitor and repeat US in 2 weeks.

## 2021-02-18 NOTE — TELEPHONE ENCOUNTER
Patient underwent surgery on 1/29/21 with Dr. Sherice Valera:     anterior cervical five-cervical six discectomy and fusion

## 2021-02-18 NOTE — TELEPHONE ENCOUNTER
Patient underwent surgery on 1/29/21 with Dr. Red Dodson:    anterior cervical five-cervical six discectomy and fusion    Called patient to inform him of information regarding US and X-ray results relayed from Lexis Fermin, [address]   Reviewed care of DVT in the leg,

## 2021-03-09 ENCOUNTER — HOSPITAL ENCOUNTER (OUTPATIENT)
Dept: ULTRASOUND IMAGING | Age: 46
Discharge: HOME | End: 2021-03-09
Attending: PHYSICIAN ASSISTANT
Payer: COMMERCIAL

## 2021-03-09 DIAGNOSIS — M79.89 RIGHT LEG SWELLING: ICD-10-CM

## 2021-03-09 DIAGNOSIS — Z86.718 HISTORY OF DEEP VENOUS THROMBOSIS (DVT) OF DISTAL VEIN OF RIGHT LOWER EXTREMITY: ICD-10-CM

## 2021-03-09 PROCEDURE — 93971 EXTREMITY STUDY: CPT | Performed by: PHYSICIAN ASSISTANT

## 2021-03-11 ENCOUNTER — TELEPHONE (OUTPATIENT)
Dept: SURGERY | Facility: CLINIC | Age: 46
End: 2021-03-11

## 2021-03-11 NOTE — TELEPHONE ENCOUNTER
[facility] pt [facility]. No answer LVB.

## 2021-03-11 NOTE — TELEPHONE ENCOUNTER
Dr. Napoleon Shrestha reviewed the US results. He has a stable clot but there is no involvement of the critical blood vessels. He does not need to repeat US unless swelling is getting worse. Dr. Napoleon Shrestha can see him at his f/u appt in a few weeks.     If right

## 2021-03-12 ENCOUNTER — TELEPHONE (OUTPATIENT)
Dept: SURGERY | Facility: CLINIC | Age: 46
End: 2021-03-12

## 2021-03-12 NOTE — TELEPHONE ENCOUNTER
Spoke to patient and relayed results to patient. He states his symptoms have not changed. He appreciated the call.

## 2021-03-12 NOTE — TELEPHONE ENCOUNTER
vd fax from [employer] [employer] [place] requesting records dated 1/1/21 to present from [facility]. Copy made and sent to scanning. Scan Stat. Included in fax is request for \"Attending [de-identified] Statement\" to be completed. Endorsed to provider for completion.

## 2021-03-15 NOTE — TELEPHONE ENCOUNTER
Forms initiated and placed on Dr. Sonia Bhagat desk for review and signature     Routed to provider to inform

## 2021-03-16 NOTE — TELEPHONE ENCOUNTER
Received completed signed forms from Dr. Marbin Dickey forms and all requested information to [facility] #[phone]    Fax confirmation received    Copy made    Sent to scan     Messaged pt via [facility] to inform that forms have been completed and faxed

## 2021-03-24 ENCOUNTER — OFFICE VISIT (OUTPATIENT)
Dept: SURGERY | Facility: CLINIC | Age: 46
End: 2021-03-24
Payer: COMMERCIAL

## 2021-03-24 VITALS
DIASTOLIC BLOOD PRESSURE: 58 MMHG | BODY MASS INDEX: 25.6 KG/M2 | HEIGHT: 72 IN | HEART RATE: 70 BPM | WEIGHT: 189 LBS | SYSTOLIC BLOOD PRESSURE: 110 MMHG

## 2021-03-24 DIAGNOSIS — I82.441 ACUTE DEEP VEIN THROMBOSIS (DVT) OF TIBIAL VEIN OF RIGHT LOWER EXTREMITY (HCC): Primary | ICD-10-CM

## 2021-03-24 DIAGNOSIS — Z98.1 S/P CERVICAL SPINAL FUSION: ICD-10-CM

## 2021-03-24 PROCEDURE — 3078F DIAST BP <80 MM HG: CPT | Performed by: NEUROLOGICAL SURGERY

## 2021-03-24 PROCEDURE — 3074F SYST BP LT 130 MM HG: CPT | Performed by: NEUROLOGICAL SURGERY

## 2021-03-24 PROCEDURE — 3008F BODY MASS INDEX DOCD: CPT | Performed by: NEUROLOGICAL SURGERY

## 2021-03-24 PROCEDURE — 99024 POSTOP FOLLOW-UP VISIT: CPT | Performed by: NEUROLOGICAL SURGERY

## 2021-03-24 NOTE — PROGRESS NOTES
[facility]  Neurosurgery Clinic Visit      HISTORY OF PRESENT ILLNESS:  Sherie Faulkner is a(n) 55year old male here for follow-up after 1/29/21 C5-6 ACDF. He has minimal neck pain.  He has improved pain down his right arm, still no He reports current alcohol use. He reports that he does not use drugs. ALLERGIES:  No Known Allergies    MEDICATIONS:  (Not in a hospital admission)    No current facility-administered medications for this visit.       REVIEW OF SYSTEMS:  A 10-point syst

## 2021-03-24 NOTE — PROGRESS NOTES
Patient here for 8-week postop follow-up sx 01/29/21. Still having numbness to right hand. Occasional twinges of pain to top of hand. But states now able to make a fist. Otherwise arm pain much improved. Still with some occasional tightness to neck.

## 2021-03-26 ENCOUNTER — TELEPHONE (OUTPATIENT)
Dept: SURGERY | Facility: CLINIC | Age: 46
End: 2021-03-26

## 2021-03-26 NOTE — TELEPHONE ENCOUNTER
Attending Physician's Statement- Progress Report received on 3.26.21 from [facility] [address] [place].   Endorsed to provider for completion

## 2021-03-26 NOTE — TELEPHONE ENCOUNTER
Forms initiated and endorsed to provider     Forms placed on Dr. Sonia Bhagat desk     Routed to provider to inform

## 2021-03-30 ENCOUNTER — OFFICE VISIT (OUTPATIENT)
Dept: PHYSICAL THERAPY | Age: 46
End: 2021-03-30
Attending: NEUROLOGICAL SURGERY
Payer: COMMERCIAL

## 2021-03-30 DIAGNOSIS — Z98.1 S/P CERVICAL SPINAL FUSION: ICD-10-CM

## 2021-03-30 DIAGNOSIS — I82.441 ACUTE DEEP VEIN THROMBOSIS (DVT) OF TIBIAL VEIN OF RIGHT LOWER EXTREMITY (HCC): ICD-10-CM

## 2021-03-30 PROCEDURE — 97162 PT EVAL MOD COMPLEX 30 MIN: CPT

## 2021-03-30 PROCEDURE — 97110 THERAPEUTIC EXERCISES: CPT

## 2021-03-30 NOTE — PROGRESS NOTES
SPINE EVALUATION:   Referring Physician: Dr. Moreno Standing  Diagnosis: S/P cervical spinal fusion (1/29/21 C5-6 ACDF), Acute DVT of tibial vein of R LE Date of Service: 3/30/2021     PATIENT SUMMARY   Yanique Woodward is a 55year old male who presents to the been stable in comparison to earlier ultrasounds. X-ray report on 2/18/21: Anterior fixation plate and screws along with intervertebral disc cage at C5-C6. 2 mm retrolisthesis of C6 over C7.  Prevertebral soft tissue predental space are within normal staley Observation/Posture: R UE atrophy in comparison to the L. Transfers with decreased segmental movement and muscle guarding. Neuro Screen:  Hypersensitivity to palmar and dorsal 2nd and 3rd finger.  (C7)  Pain in dorsal interphalangeal region of 2nd and 3 (*) are part of the patient's HEP and with (-) are not completed that visit    Charges: PT Eval Moderate Complexity, 2 TherEx      Total Timed Treatment: 25 min     Total Treatment Time: 45 min     Based on clinical rationale and outcome measures, this sailaja

## 2021-04-01 ENCOUNTER — OFFICE VISIT (OUTPATIENT)
Dept: PHYSICAL THERAPY | Age: 46
End: 2021-04-01
Attending: NEUROLOGICAL SURGERY
Payer: COMMERCIAL

## 2021-04-01 DIAGNOSIS — I82.441 ACUTE DEEP VEIN THROMBOSIS (DVT) OF TIBIAL VEIN OF RIGHT LOWER EXTREMITY (HCC): ICD-10-CM

## 2021-04-01 DIAGNOSIS — Z98.1 S/P CERVICAL SPINAL FUSION: ICD-10-CM

## 2021-04-01 PROCEDURE — 97140 MANUAL THERAPY 1/> REGIONS: CPT

## 2021-04-01 PROCEDURE — 97110 THERAPEUTIC EXERCISES: CPT

## 2021-04-01 PROCEDURE — 97112 NEUROMUSCULAR REEDUCATION: CPT

## 2021-04-01 NOTE — PROGRESS NOTES
Diagnosis: S/P cervical spinal fusion (1/29/21 C5-6 ACDF), Acute DVT of tibial vein of R LE  Precautions: Cervical fusion C5-6, DVT (stable)           Insurance Type (# Auth): BCBS OOS PPO (10)   Treatment time: 40 min             Charges: 1 Man, 1 TherEx, , R  Towel squeeze, light, x10   Note: exercises with (*) are part of the patient's HEP and with (-) are not completed that visit

## 2021-04-06 NOTE — TELEPHONE ENCOUNTER
Received completed and signed Attending Physician's Statement form from Dr. Bridger Wolfe as requested to [facility] #[phone], fax confirmation received     Copy made    Endorsed to , with printed scanning sheet, to be scanned    Rachel Grace

## 2021-04-08 ENCOUNTER — OFFICE VISIT (OUTPATIENT)
Dept: PHYSICAL THERAPY | Age: 46
End: 2021-04-08
Attending: NEUROLOGICAL SURGERY
Payer: COMMERCIAL

## 2021-04-08 PROCEDURE — 97140 MANUAL THERAPY 1/> REGIONS: CPT

## 2021-04-08 PROCEDURE — 97112 NEUROMUSCULAR REEDUCATION: CPT

## 2021-04-08 PROCEDURE — 97110 THERAPEUTIC EXERCISES: CPT

## 2021-04-08 NOTE — PROGRESS NOTES
Diagnosis: S/P cervical spinal fusion (1/29/21 C5-6 ACDF), Acute DVT of tibial vein of R LE  Precautions: Cervical fusion C5-6, DVT (stable)           Insurance Type (# Auth): BCBS OOS PPO (10)   Treatment time: 40 min             Charges: 1 Man, 1 TherEx, glides with wrist in neutral - ulnar and median, 4 min, ulnar glide with wrist flex/ext alternate for floss, 1 min Glides - ulnar, median radial with wrist in neutral, 5 min   DNF *2 sec hold, x20 - *Tuck and lift, x10   SA punch   *Cane, supine,  x10, 50%

## 2021-04-13 ENCOUNTER — OFFICE VISIT (OUTPATIENT)
Dept: PHYSICAL THERAPY | Age: 46
End: 2021-04-13
Attending: NEUROLOGICAL SURGERY
Payer: COMMERCIAL

## 2021-04-13 PROCEDURE — 97110 THERAPEUTIC EXERCISES: CPT

## 2021-04-13 PROCEDURE — 97112 NEUROMUSCULAR REEDUCATION: CPT

## 2021-04-13 PROCEDURE — 97140 MANUAL THERAPY 1/> REGIONS: CPT

## 2021-04-13 NOTE — PROGRESS NOTES
Diagnosis: S/P cervical spinal fusion (1/29/21 C5-6 ACDF), Acute DVT of tibial vein of R LE  Precautions: Cervical fusion C5-6, DVT (stable)           Insurance Type (# Auth): BCBS OOS PPO (10)   Treatment time: 40 min             Charges: 1 Man, 1 TherEx, wrist, 5 min Cervical - flexion with min lat side bend R Wrist, 3 min   Desensitization Education  *cotton to palmar hand - *HEP -   Cervical AROM    Side bending, x5   Nerve glide *light median nerve rocking, x20 Small glides with wrist in neutral - ulnar

## 2021-04-15 ENCOUNTER — OFFICE VISIT (OUTPATIENT)
Dept: PHYSICAL THERAPY | Age: 46
End: 2021-04-15
Attending: NEUROLOGICAL SURGERY
Payer: COMMERCIAL

## 2021-04-15 PROCEDURE — 97112 NEUROMUSCULAR REEDUCATION: CPT

## 2021-04-15 PROCEDURE — 97110 THERAPEUTIC EXERCISES: CPT

## 2021-04-15 NOTE — PROGRESS NOTES
Diagnosis: S/P cervical spinal fusion (1/29/21 C5-6 ACDF), Acute DVT of tibial vein of R LE  Precautions: Cervical fusion C5-6, DVT (stable)           Insurance Type (# Auth): BCBS OOS PPO (10)   Treatment time: 40 min             Charges: 2 TherEx, 1 Neur Side bending, 2 min   Nerve glide Small glides with wrist in neutral - ulnar and median, 4 min, ulnar glide with wrist flex/ext alternate for floss, 1 min Glides - ulnar, median radial with wrist in neutral, 5 min Glides - ulnar, median radial with wrist i

## 2021-04-20 ENCOUNTER — OFFICE VISIT (OUTPATIENT)
Dept: PHYSICAL THERAPY | Age: 46
End: 2021-04-20
Attending: NEUROLOGICAL SURGERY
Payer: COMMERCIAL

## 2021-04-20 PROCEDURE — 97110 THERAPEUTIC EXERCISES: CPT

## 2021-04-20 NOTE — PROGRESS NOTES
Diagnosis: S/P cervical spinal fusion (1/29/21 C5-6 ACDF), Acute DVT of tibial vein of R LE  Precautions: Cervical fusion C5-6, DVT (stable)           Insurance Type (# Auth): BCBS OOS PPO (10)   Treatment time: 40 min             Charges: 3 TherEx    Subj Nerve glide Glides - ulnar, median radial with wrist in neutral, 5 min Glides - ulnar, median radial with wrist in neutral, 5 min *Median and radial nerve glides, 5 min   Glide x5 radial and median   DNF *Tuck and lift, x10 Tuck and lift, x10 - -    pu

## 2021-04-22 ENCOUNTER — APPOINTMENT (OUTPATIENT)
Dept: PHYSICAL THERAPY | Age: 46
End: 2021-04-22
Attending: NEUROLOGICAL SURGERY
Payer: COMMERCIAL

## 2021-04-22 ENCOUNTER — TELEPHONE (OUTPATIENT)
Dept: PHYSICAL THERAPY | Facility: HOSPITAL | Age: 46
End: 2021-04-22

## 2021-04-23 ENCOUNTER — TELEPHONE (OUTPATIENT)
Dept: SURGERY | Facility: CLINIC | Age: 46
End: 2021-04-23

## 2021-04-23 NOTE — TELEPHONE ENCOUNTER
Patient needs US doppler for r/o DVT prior to apt on 5-5. [employer] message sent as a reminder.

## 2021-04-27 ENCOUNTER — OFFICE VISIT (OUTPATIENT)
Dept: PHYSICAL THERAPY | Age: 46
End: 2021-04-27
Attending: NEUROLOGICAL SURGERY
Payer: COMMERCIAL

## 2021-04-27 ENCOUNTER — TELEPHONE (OUTPATIENT)
Dept: NEUROLOGY | Facility: CLINIC | Age: 46
End: 2021-04-27

## 2021-04-27 PROCEDURE — 97110 THERAPEUTIC EXERCISES: CPT

## 2021-04-27 NOTE — PROGRESS NOTES
Diagnosis: S/P cervical spinal fusion (1/29/21 C5-6 ACDF), Acute DVT of tibial vein of R LE  Precautions: Cervical fusion C5-6, DVT (stable)           Insurance Type (# Auth): BCBS OOS PPO (10) MD appt 5/5  Treatment time: 40 min             Charges: 3 The Nerve glide Glides - ulnar, median radial with wrist in neutral, 5 min *Median and radial nerve glides, 5 min   Glide x 5 radial and median -   DNF Tuck and lift, x10 - - -   Rhythmic stabilization    Supine, ER/IR.  Flex/ext, horiz abd/add, x10   SA punc

## 2021-04-27 NOTE — TELEPHONE ENCOUNTER
S:  Message from [facility] noted. B: Per LOV notes from Dr. Abdelrahman Burt on 3/24/21:    \"ASSESSMENT:  56 yo M with severe C6 radiculopathy, now improving s/p C5-6 ACDF. Discussed that his symptoms were so severe preop that a prolonged recovery is expected.  We al

## 2021-04-27 NOTE — TELEPHONE ENCOUNTER
Pt needs updated note for his employer; needs specific dates. Pt's employer ([employer]) needs specific dates for pt being off of work. Original RTW 5/5/21; pt requested another month. Pt asking for date to be 6/5/21.   Pt stated his hand is not getting be

## 2021-04-28 NOTE — TELEPHONE ENCOUNTER
Pt returned nurses call.   I gave pt [facility] help number he will call to try to get the letter; if unable he will come to office to

## 2021-04-28 NOTE — TELEPHONE ENCOUNTER
Per Love Gill:  Epic MyChart communications team is on the phone regarding letter for patient's off work status. Letter had been printed previously, so it is not available on [employer] for patient access. Letter resent to patient via [address]

## 2021-04-28 NOTE — TELEPHONE ENCOUNTER
Patient called with his HR for [employer] on the phone as well. She states that he needs a form filled out with the start date of leave being 4-29-21 and end date 6-5-21. She needs question #2 to say \"yes\". She stated it can take 2 days for a fax to come

## 2021-04-28 NOTE — TELEPHONE ENCOUNTER
Called pt to give [facility] Help contact number ( [phone] ) to assist him in locating letter in [facility]. No answer, [facility] if he chooses to not use the [facility] help line and would rather have the work letter printed and  in the clinic.

## 2021-04-28 NOTE — TELEPHONE ENCOUNTER
Form completed and signed by Dr Hanh Richards. Faxed to Radha Lozano at [phone]. Confirmation received. Copy sent to scanning. Mailed copy to patient.

## 2021-04-28 NOTE — TELEPHONE ENCOUNTER
Evan Luciano from [place] [address] called re pt's letter in [facility]. Letter showed as printed so pt could not access it.  I spoke with Ny Wu, she resent the letter to pt's [facility].

## 2021-04-29 ENCOUNTER — OFFICE VISIT (OUTPATIENT)
Dept: PHYSICAL THERAPY | Age: 46
End: 2021-04-29
Attending: NEUROLOGICAL SURGERY
Payer: COMMERCIAL

## 2021-04-29 PROCEDURE — 97140 MANUAL THERAPY 1/> REGIONS: CPT

## 2021-04-29 PROCEDURE — 97110 THERAPEUTIC EXERCISES: CPT

## 2021-04-29 NOTE — PROGRESS NOTES
Diagnosis: S/P cervical spinal fusion (1/29/21 C5-6 ACDF), Acute DVT of tibial vein of R LE  Precautions: Cervical fusion C5-6, DVT (stable)           Insurance Type (# Auth): BCBS OOS PPO (10) MD appt 5/5  Treatment time: 40 min             Charges: 2 The stabilization   Supine, ER/IR.  Flex/ext, horiz abd/add, x10 Supine Flex/ext, horiz abd/add, x10   SA punch - - X20, 2# X20, 2#   Supine flex Sitting press, cane 2#, x20 Supine, x10, 1#  Supine, PNF, x10, 0# Supine, x20, 2#  Supine, PNF, x10, 0# Supine, x10

## 2021-04-30 ENCOUNTER — TELEPHONE (OUTPATIENT)
Dept: SURGERY | Facility: CLINIC | Age: 46
End: 2021-04-30

## 2021-04-30 NOTE — TELEPHONE ENCOUNTER
Received request for medical records from [facility] for Eddie 10. 1.20 to present.   Original sent to [employer], copy sent to scanning

## 2021-05-04 ENCOUNTER — OFFICE VISIT (OUTPATIENT)
Dept: PHYSICAL THERAPY | Age: 46
End: 2021-05-04
Attending: NEUROLOGICAL SURGERY
Payer: COMMERCIAL

## 2021-05-04 PROCEDURE — 97112 NEUROMUSCULAR REEDUCATION: CPT

## 2021-05-04 PROCEDURE — 97110 THERAPEUTIC EXERCISES: CPT

## 2021-05-04 NOTE — PROGRESS NOTES
Diagnosis: S/P cervical spinal fusion (1/29/21 C5-6 ACDF), Acute DVT of tibial vein of R LE  Precautions: Cervical fusion C5-6, DVT (stable)           Insurance Type (# Auth): BCBS OOS PPO (10) MD appt 5/5  Treatment time: 45 min             Charges: 2 The Rot   4+ 5     Internal Rot   5 5   RTC         Subscapularis - - 5 5     Supraspinatus - - 4+ 5     Infraspinatus - - 4+ 5     Teres - - 4 5   Elbow WNL WNL       Flexion   4+ 5     Extension   4+ *(cervical pain) 5    Wrist / Hand         Flexion WNL WNL Patient/Family/Caregiver was advised of these findings, precautions, and treatment options and has agreed to actively participate in planning and for this course of care.     Thank you for your referral. If you have any questions, please contact me at D x15, 1#  horiz abd: elbow bent, 0#, x15 Standing:   Ext, x10, red  Row, x10, red horiz abd: x10, 0#  Ext: x10, 0#  Lower trap: x10, elbow bent 0# -   SB slide *wall:  Flex, abd, circles cw, ccw, x10 - - -   bicep *x20, red - - -   Note: exercises with (*)

## 2021-05-05 ENCOUNTER — OFFICE VISIT (OUTPATIENT)
Dept: SURGERY | Facility: CLINIC | Age: 46
End: 2021-05-05
Payer: COMMERCIAL

## 2021-05-05 VITALS
SYSTOLIC BLOOD PRESSURE: 122 MMHG | DIASTOLIC BLOOD PRESSURE: 74 MMHG | HEIGHT: 72 IN | WEIGHT: 195 LBS | BODY MASS INDEX: 26.41 KG/M2 | HEART RATE: 65 BPM

## 2021-05-05 DIAGNOSIS — M79.641 RIGHT HAND PAIN: ICD-10-CM

## 2021-05-05 DIAGNOSIS — M54.12 CERVICAL RADICULOPATHY: Primary | ICD-10-CM

## 2021-05-05 PROCEDURE — 3008F BODY MASS INDEX DOCD: CPT | Performed by: NEUROLOGICAL SURGERY

## 2021-05-05 PROCEDURE — 99024 POSTOP FOLLOW-UP VISIT: CPT | Performed by: NEUROLOGICAL SURGERY

## 2021-05-05 PROCEDURE — 3074F SYST BP LT 130 MM HG: CPT | Performed by: NEUROLOGICAL SURGERY

## 2021-05-05 PROCEDURE — 3078F DIAST BP <80 MM HG: CPT | Performed by: NEUROLOGICAL SURGERY

## 2021-05-05 RX ORDER — PREGABALIN 100 MG/1
100 CAPSULE ORAL 2 TIMES DAILY
Qty: 60 CAPSULE | Refills: 0 | Status: SHIPPED | OUTPATIENT
Start: 2021-05-05 | End: 2021-08-03

## 2021-05-05 NOTE — PROGRESS NOTES
[facility]  Neurosurgery Clinic Visit      HISTORY OF PRESENT ILLNESS:  Estrella Romano is a(n) 55year old male s/p 1/29/21 C5-6 ACDF c/b by below the knee DVT. He returns for follow-up after PT.   He continues to note improvement i SYSTEMS:  A 10-point system was reviewed. Pertinent positives and negatives are noted in HPI.       PHYSICAL EXAMINATION:  VITAL SIGNS: /74   Pulse 65   Ht 72\"   Wt 195 lb (88.5 kg)   BMI 26.45 kg/m²   GENERAL:  Patient is a 55year old male in no a

## 2021-05-06 ENCOUNTER — HOSPITAL ENCOUNTER (OUTPATIENT)
Dept: ULTRASOUND IMAGING | Age: 46
Discharge: HOME | End: 2021-05-06
Attending: PHYSICIAN ASSISTANT
Payer: COMMERCIAL

## 2021-05-06 ENCOUNTER — OFFICE VISIT (OUTPATIENT)
Dept: PHYSICAL THERAPY | Age: 46
End: 2021-05-06
Attending: NEUROLOGICAL SURGERY
Payer: COMMERCIAL

## 2021-05-06 PROCEDURE — 97112 NEUROMUSCULAR REEDUCATION: CPT

## 2021-05-06 PROCEDURE — 97110 THERAPEUTIC EXERCISES: CPT

## 2021-05-06 PROCEDURE — 93971 EXTREMITY STUDY: CPT | Performed by: PHYSICIAN ASSISTANT

## 2021-05-06 NOTE — PROGRESS NOTES
Diagnosis: S/P cervical spinal fusion (1/29/21 C5-6 ACDF), Acute DVT of tibial vein of R LE  Precautions: Cervical fusion C5-6, DVT (stable)           Insurance Type (# Auth): BCBS OOS PPO (15) Treatment time: 45 min             Charges: 2 TherEx, 1 NeuroR Flex/ext, horiz abd/add, x10 Supine flex/ext x10 -   SA punch X20, 2# X20, 2# - X20, 2#   Supine flex Supine, x20, 2#  Supine, PNF, x10, 0# Supine, x10, 2# Supine, x10 with cane (*impingement pain) Supine PNF, x15, 0#   ER, R - S/L ER, x20, 2#  90/90 isome

## 2021-05-07 ENCOUNTER — TELEPHONE (OUTPATIENT)
Dept: SURGERY | Facility: CLINIC | Age: 46
End: 2021-05-07

## 2021-05-07 NOTE — TELEPHONE ENCOUNTER
Patient dropped off Attending Physicians Statement form to be completed for long term disability. Form to be completed and faxed to [phone]. Endorsed to [employer]. Please call patient for any other questions.

## 2021-05-11 ENCOUNTER — APPOINTMENT (OUTPATIENT)
Dept: PHYSICAL THERAPY | Age: 46
End: 2021-05-11
Attending: NEUROLOGICAL SURGERY
Payer: COMMERCIAL

## 2021-05-17 NOTE — TELEPHONE ENCOUNTER
Received completed and signed Attending Physician Statement Form from Dr. North Necessary made     Endorsed to front dest staff with printed scanning sheet     Faxed form and OVN 5/6/21 to [employer] [place] at [phone] as requested, fax confirmation received     Messaged pt via [facility] to inform that forms have been faxed as requested.  In addition, informed pt that we are mailing original forms to pt's home address     Mailed original forms to pt's home address

## 2021-05-18 ENCOUNTER — TELEPHONE (OUTPATIENT)
Dept: PHYSICAL THERAPY | Facility: HOSPITAL | Age: 46
End: 2021-05-18

## 2021-05-20 ENCOUNTER — OFFICE VISIT (OUTPATIENT)
Dept: PHYSICAL THERAPY | Age: 46
End: 2021-05-20
Attending: NEUROLOGICAL SURGERY
Payer: COMMERCIAL

## 2021-05-20 PROCEDURE — 97140 MANUAL THERAPY 1/> REGIONS: CPT

## 2021-05-20 PROCEDURE — 97110 THERAPEUTIC EXERCISES: CPT

## 2021-05-20 NOTE — PROGRESS NOTES
Diagnosis: S/P cervical spinal fusion (1/29/21 C5-6 ACDF), Acute DVT of tibial vein of R LE  Precautions: Cervical fusion C5-6, DVT (stable)           Insurance Type (# Auth): BCBS OOS PPO (15) Treatment time: 45 min             Charges: 2 TherEx, 1 Man flossing with passive head side bending,  x10  Radial and median nerve glide, x5 Median nerve glides, x10 PROM, 5 min    AROM, x10 supine ULTT -   Rhythmic stabilization Supine Flex/ext, horiz abd/add, x10 Supine flex/ext x10 - -   SA punch X20, 2# - X20,

## 2021-05-25 ENCOUNTER — OFFICE VISIT (OUTPATIENT)
Dept: PHYSICAL THERAPY | Age: 46
End: 2021-05-25
Attending: NEUROLOGICAL SURGERY
Payer: COMMERCIAL

## 2021-05-25 PROCEDURE — 97110 THERAPEUTIC EXERCISES: CPT

## 2021-05-25 PROCEDURE — 97112 NEUROMUSCULAR REEDUCATION: CPT

## 2021-05-25 NOTE — PROGRESS NOTES
Diagnosis: S/P cervical spinal fusion (1/29/21 C5-6 ACDF), Acute DVT of tibial vein of R LE  Precautions: Cervical fusion C5-6, DVT (stable)           Insurance Type (# Auth): BCBS OOS PPO (15) Treatment time: 45 min             Charges: 2 TherEx, 1 NeuroR Nerve glide Median nerve glides, x10 PROM, 5 min    AROM, x10 supine ULTT - -   Rhythmic stabilization Supine flex/ext x10 - - Supine flex/ext x10  IR/ER arm straight, x10   SA punch - X20, 2# X20, 2# X20, 2#   Supine flex Supine, x10 with cane (*impinge

## 2021-05-27 ENCOUNTER — TELEPHONE (OUTPATIENT)
Dept: PHYSICAL THERAPY | Facility: HOSPITAL | Age: 46
End: 2021-05-27

## 2021-05-27 ENCOUNTER — PROCEDURE VISIT (OUTPATIENT)
Dept: NEUROLOGY | Facility: CLINIC | Age: 46
End: 2021-05-27
Payer: COMMERCIAL

## 2021-05-27 DIAGNOSIS — M54.12 CERVICAL RADICULOPATHY: ICD-10-CM

## 2021-05-27 PROCEDURE — 95886 MUSC TEST DONE W/N TEST COMP: CPT | Performed by: OTHER

## 2021-05-27 PROCEDURE — 95910 NRV CNDJ TEST 7-8 STUDIES: CPT | Performed by: OTHER

## 2021-05-29 ENCOUNTER — PATIENT MESSAGE (OUTPATIENT)
Dept: SURGERY | Facility: CLINIC | Age: 46
End: 2021-05-29

## 2021-06-01 ENCOUNTER — OFFICE VISIT (OUTPATIENT)
Dept: PHYSICAL THERAPY | Age: 46
End: 2021-06-01
Attending: NEUROLOGICAL SURGERY
Payer: COMMERCIAL

## 2021-06-01 ENCOUNTER — TELEPHONE (OUTPATIENT)
Dept: SURGERY | Facility: CLINIC | Age: 46
End: 2021-06-01

## 2021-06-01 PROCEDURE — 97110 THERAPEUTIC EXERCISES: CPT

## 2021-06-01 PROCEDURE — 97140 MANUAL THERAPY 1/> REGIONS: CPT

## 2021-06-01 RX ORDER — HYDROCODONE BITARTRATE AND ACETAMINOPHEN 10; 325 MG/1; MG/1
1 TABLET ORAL EVERY 8 HOURS PRN
Qty: 40 TABLET | Refills: 0 | Status: SHIPPED | OUTPATIENT
Start: 2021-06-01 | End: 2021-06-18

## 2021-06-01 NOTE — TELEPHONE ENCOUNTER
Medication: Hydrocodone    Date of last refill: 2/10/21 (#40/0)  [place]      Last office visit: 5/5/21  Due back to clinic per last office note:  -RTC in 6 weeks  Date next office visit scheduled:    Future Appointments

## 2021-06-01 NOTE — TELEPHONE ENCOUNTER
From: Bhargavi Gramajo  To: Alexis Vargas MD  Sent: 5/29/2021 4:52 PM CDT  Subject: Prescription Question    Can you please authorize a refill for for Pain (Hydrocodone). ..

## 2021-06-01 NOTE — TELEPHONE ENCOUNTER
Received Certification of Health Care Provider to Care for Self form.   Endorsed to RN for completion Visit #:  19    Subjective:  Velma Diaz is a 48 year old female who is seen in f/u up for:        Segmental dysfunction of pelvic region  Lumbago  Segmental dysfunction of lumbar region  Spasm of muscle  Thoracic segment dysfunction.     Since last visit on 9/9/2019,  Velma Diaz reports:    Area of chief complaint:  Lumbar :  Symptoms are graded at 5/10. The quality is described as stiff, and achey.  Motion has decreased. Patient feels that her low back is stiff and sore.  Mireille reports that she was feeling pretty good until last Sunday when she started feeling numb and tingly down her whole right side.  She has been to the ER and cleared for stroke.  The numbness continues and she is being followed up on with her primary.      Objective:  The following was observed:    P: palpatory tenderness Piriformis R>>L  A: static palpation demonstrates intersegmental asymmetry , thoracic, lumbar, pelvis  R: motion palpation notes restricted motion, C1, C2, T10, T11 , T12 , L4 , L5  and PSIS Right   T: hypertonicity at: Piriformis R>>L    Segmental spinal dysfunction/restrictions found at:  C1, C2, T10, T11 , T12 , L4 , L5  and PSIS Right       Assessment:    Diagnoses:      1. Segmental dysfunction of pelvic region    2. Lumbago    3. Segmental dysfunction of lumbar region    4. Spasm of muscle    5. Thoracic segment dysfunction   Cervical segment dysfunction       Patient's condition:  Patient had restrictions pre-manipulation    Treatment effectiveness:  Post manipulation there is better intersegmental movement and Patient claims to feel looser post manipulation      Procedures:  CMT:  95185 Chiropractic manipulative treatment 3-4 regions performed   Cervical: Actovator, C1, C2, prone  Thoracic: Activator, T10, T11, T12, Prone  Lumbar: Activator, L4, L5, Prone  Pelvis: Drop Table, PSIS Right , Prone    Modalities:  11584: Acupuncture, for 15 minutes:  Points: B25, B27, GV3, K3, B62, SI3  Ahsi point in  piriformis  For 15 minutes    Therapeutic procedures:  None    Response to Treatment  Reduction in symptoms as reported by patient    Prognosis: Good    Progress towards Goals: Patient is making progress towards the goal.     Recommendations:    Instructions:  ice 20 minutes every other hour as needed    Follow-up:    Return to care in one week.

## 2021-06-01 NOTE — PROGRESS NOTES
Diagnosis: S/P cervical spinal fusion (1/29/21 C5-6 ACDF), Acute DVT of tibial vein of R LE  Precautions: Cervical fusion C5-6, DVT (stable)           Insurance Type (# Auth): BCBS OOS PPO (15) Treatment time: 40 min             Charges: 1 Man, 2 TherEx SA punch X20, 2# X20, 2# X20, 2# x20   Supine flex Supine PNF, x15, 0# Supine PNF, x15, 1# Supine PNF, x15, 0# Supine PNF, x15, 0#  Supine flex, x20, 1#   ER, R - S/L, x20, 2# S/L, x15, 2# S/L, x15, 2#   abduction - S/L, x20, 1# S/L, x15, 1# S/L, x15, 1#

## 2021-06-03 NOTE — TELEPHONE ENCOUNTER
Pt calling to confirm that his paperwork was received. He needs it done asap. He is expected back to work on the June 5th but the doctor is supposed to extend it to July 5th. So please fax to the employer by the end of day June 4th.

## 2021-06-03 NOTE — TELEPHONE ENCOUNTER
Nursing received forms and will attempt to have them completed as soon as possible. Dr. Michell Gaytan is not in the office and will not be here until next week on Wednesday. Our office does have a 14 day turn around time policy for forms.      Pt called and

## 2021-06-04 ENCOUNTER — TELEPHONE (OUTPATIENT)
Dept: INTERNAL MEDICINE CLINIC | Facility: CLINIC | Age: 46
End: 2021-06-04

## 2021-06-04 ENCOUNTER — TELEPHONE (OUTPATIENT)
Dept: SURGERY | Facility: CLINIC | Age: 46
End: 2021-06-04

## 2021-06-04 NOTE — TELEPHONE ENCOUNTER
Received paperwork from The [address] re: Long Term Disability Benefits.   Endorsed to RN for completion

## 2021-06-07 NOTE — TELEPHONE ENCOUNTER
Forms completed and faxed back to the [address] [place], [place] [address] [place] at [phone] - copies made for scan and pod FMLA folder.

## 2021-06-08 ENCOUNTER — OFFICE VISIT (OUTPATIENT)
Dept: PHYSICAL THERAPY | Age: 46
End: 2021-06-08
Attending: NEUROLOGICAL SURGERY
Payer: COMMERCIAL

## 2021-06-08 PROCEDURE — 97110 THERAPEUTIC EXERCISES: CPT

## 2021-06-08 NOTE — PROGRESS NOTES
Diagnosis: S/P cervical spinal fusion (1/29/21 C5-6 ACDF), Acute DVT of tibial vein of R LE  Precautions: Cervical fusion C5-6, DVT (stable)           Insurance Type (# Auth): BCBS OOS PPO (15) Treatment time: 40 min             Charges: 3 TherEx   (f/u wi Median and ulnar glides, 2 min -   Rhythmic stabilization - Supine flex/ext x10  IR/ER arm straight, x10 - -   SA punch X20, 2# X20, 2# x20 -   Supine flex Supine PNF, x15, 1# Supine PNF, x15, 0# Supine PNF, x15, 0#  Supine flex, x20, 1# Supine PNF, x15, 1

## 2021-06-09 NOTE — TELEPHONE ENCOUNTER
Pt calling again for status of form completion. I again advised pt of 14 day turn around for paperwork completion.   Pt stated this is urgent or he will lose benefits

## 2021-06-09 NOTE — TELEPHONE ENCOUNTER
Received completed and signed forms from Dr. Jeniffer Hunt     Faxed to -Leave of absence at [phone] as requested, fax confirmation received. Copy made     Endorsed to  staff with printed scanning sheet.      Called pt to inform that we have

## 2021-06-09 NOTE — TELEPHONE ENCOUNTER
Received signed and completed forms from Dr. Gwen Fields made     Endorsed to  staff with printed scanning sheet     Faxed forms to [employer] [place] at [phone] as requested, fax confirmation received    Called and informed pt that forms have

## 2021-06-10 NOTE — PROCEDURES
[facility]  [address]  Phone- [phone]  Nerve Conduction & Electromyography Report            Patient: Rory Crystal  Patient ID: [id number]  Sex:  Male  YOB: 1975  Age: 55 Ye N Reduced   R. TRICEPS N None None None None 1+ N N Discrete   R. BICEPS N None None None None N N N Reduced   R. FLEX CARPI RAD N None None None None N N N Reduced   R.  FIRST D INTEROSS N None None None None 1+ N N Reduced         Summary:  Bilateral medi

## 2021-06-15 ENCOUNTER — OFFICE VISIT (OUTPATIENT)
Dept: PHYSICAL THERAPY | Age: 46
End: 2021-06-15
Attending: NEUROLOGICAL SURGERY
Payer: COMMERCIAL

## 2021-06-15 PROCEDURE — 97110 THERAPEUTIC EXERCISES: CPT

## 2021-06-15 NOTE — PROGRESS NOTES
Diagnosis: S/P cervical spinal fusion (1/29/21 C5-6 ACDF), Acute DVT of tibial vein of R LE  Precautions: Cervical fusion C5-6, DVT (stable)           Insurance Type (# Auth): BCBS OOS PPO (15) Treatment time: 40 min             Charges: 3 TherEx   (f/u wi / Hand             Flexion WNL WNL 5 5     Extension WNL WNL 4 5     Pinch      *hypersensitivity       Finger Ext WNL WNL 5 5     Finger Flex WNL WNL 5 5     Finger Abd WNL WNL 5 5     Thumb WNL WNL Ext: at [facility] 4+/5 and at [facility] and IP: 4-/5  Flex: 4  Adduc and has agreed to actively participate in planning and for this course of care. Thank you for your referral. If you have any questions, please contact me at Dept: [phone].     Sincerely,  Electronically signed by therapist: Raven Gutierrez PT     Phys sec, x10 Standing punch red, x20, red   Humble and arow - - X20, yel -   horiz abd X15, yel - - X15, red   Wrist/hand theraband pull - each finger, x5, yel - - Flexor , x10  RD: x20, 2#   cable UE ext: x15, 7.5#  Row: x15, 15# UE ext: x15, 7.5#  Row: x15,

## 2021-06-16 ENCOUNTER — TELEPHONE (OUTPATIENT)
Dept: SURGERY | Facility: CLINIC | Age: 46
End: 2021-06-16

## 2021-06-16 ENCOUNTER — OFFICE VISIT (OUTPATIENT)
Dept: SURGERY | Facility: CLINIC | Age: 46
End: 2021-06-16
Payer: COMMERCIAL

## 2021-06-16 VITALS
BODY MASS INDEX: 26.41 KG/M2 | SYSTOLIC BLOOD PRESSURE: 110 MMHG | WEIGHT: 195 LBS | HEIGHT: 72 IN | DIASTOLIC BLOOD PRESSURE: 72 MMHG

## 2021-06-16 DIAGNOSIS — G95.9 CERVICAL MYELOPATHY (HCC): Primary | ICD-10-CM

## 2021-06-16 DIAGNOSIS — Z98.1 S/P CERVICAL SPINAL FUSION: ICD-10-CM

## 2021-06-16 DIAGNOSIS — M54.12 CERVICAL RADICULOPATHY: ICD-10-CM

## 2021-06-16 DIAGNOSIS — M54.12 CERVICAL RADICULOPATHY: Primary | ICD-10-CM

## 2021-06-16 DIAGNOSIS — M79.641 RIGHT HAND PAIN: ICD-10-CM

## 2021-06-16 PROCEDURE — 99024 POSTOP FOLLOW-UP VISIT: CPT | Performed by: NEUROLOGICAL SURGERY

## 2021-06-16 PROCEDURE — 3074F SYST BP LT 130 MM HG: CPT | Performed by: NEUROLOGICAL SURGERY

## 2021-06-16 PROCEDURE — 3078F DIAST BP <80 MM HG: CPT | Performed by: NEUROLOGICAL SURGERY

## 2021-06-16 PROCEDURE — 3008F BODY MASS INDEX DOCD: CPT | Performed by: NEUROLOGICAL SURGERY

## 2021-06-16 NOTE — PATIENT INSTRUCTIONS
You are scheduled for Posterior Cervical 3-7 laminoplasty and right sided foraminotomies on 7/22/21 with Dr.'s Virgen Maloney and Dr. Gosia Samaniego. You will need to contact the Pre-admission department at [phone] to schedule your pre-op testing.   They will ge days before surgery with your arrival time. · You may need an [place] cervical collar. · You may need an external bone growth stimulator. If ordered for your surgery ERNESTINE Sarmiento will contact you either before or after your surgery.  For any questions yo Bathing  · Hibiclens is a body soap that is used before surgery protect you from getting an infection after surgery   · Hibiclens comes in a large blue bottle and can be found in most pharmacies in the First Aid supplies  · Shower with this daily for FIVE for a refill, you may be due for a follow up appointment. · To best provide you care, patients receiving routine medications need to be seen at least once a year.   Patients receiving narcotic/controlled substance medications need to be seen at least once is a COVERED benefit. Although the [facility] staff does its due diligence, the insurance carrier gives the disclaimer that \"Although the procedure is authorized, this does not guarantee payment. \"    Ultimately the patient is responsible for payment.    Thank javon

## 2021-06-16 NOTE — TELEPHONE ENCOUNTER
Patient is scheduled for posterior C 3-7 laminoplasty and right sided foraminotomies with Dr Izzy Downey and Dr. Napoleon Shrestha.      form completed X  Surgery order signed ---  Placed sx on surgery sheet X  Placed on outlook calendar X  Maginatics message sent to

## 2021-06-16 NOTE — PROGRESS NOTES
Feeling ok  Right hand still numb and tingling and weakness in the arm  Still going to PT  Index finger and thumb  Neck stiffness

## 2021-06-16 NOTE — TELEPHONE ENCOUNTER
You are scheduled for Posterior Cervical 3-7 laminoplasty and right sided foraminotomies on 8/12/21 with Dr. Gosia Samaniego. and Dr. Candelario Vargas will need to contact the Pre-admission department at [phone] to schedule your pre-op testing.   They will ge 1-2 days before surgery with your arrival time. · You may need an [place] cervical collar. · You may need an external bone growth stimulator. If ordered for your surgery ERNESTINE Manning will contact you either before or after your surgery.  For any question Bathing  · Hibiclens is a body soap that is used before surgery protect you from getting an infection after surgery   · Hibiclens comes in a large blue bottle and can be found in most pharmacies in the First Aid supplies  · Shower with this daily for FIVE

## 2021-06-16 NOTE — PROGRESS NOTES
[facility]  Neurosurgery Clinic Visit      HISTORY OF PRESENT ILLNESS:  Mona Hercules is a(n) 55year old male here for follow-up after C5-6 ACDF for severe radiculopathy.  Since last visit he notes minimal improvement in his right alert and oriented x 3    Cranial nerves: Pupils equally round and reactive to light. EOMs intact. Face is symmetrical and sensation is intact. Tongue is midline.      Motor: R D4+ B4+ T4+ HG/IO4, otherwise 5/5 x 4   Sensation: intact to light touch bila

## 2021-06-17 ENCOUNTER — TELEPHONE (OUTPATIENT)
Dept: SURGERY | Facility: CLINIC | Age: 46
End: 2021-06-17

## 2021-06-17 NOTE — TELEPHONE ENCOUNTER
Pt calling for refill on hydrocodone.      [id number] [address], [address] [place], [phone], [phone]

## 2021-06-18 ENCOUNTER — TELEPHONE (OUTPATIENT)
Dept: SURGERY | Facility: CLINIC | Age: 46
End: 2021-06-18

## 2021-06-18 RX ORDER — HYDROCODONE BITARTRATE AND ACETAMINOPHEN 10; 325 MG/1; MG/1
1 TABLET ORAL EVERY 8 HOURS PRN
Qty: 40 TABLET | Refills: 0 | Status: SHIPPED | OUTPATIENT
Start: 2021-06-18 | End: 2021-08-03

## 2021-06-18 RX ORDER — HYDROCODONE BITARTRATE AND ACETAMINOPHEN 10; 325 MG/1; MG/1
1 TABLET ORAL EVERY 8 HOURS PRN
Qty: 40 TABLET | Refills: 0 | Status: SHIPPED | OUTPATIENT
Start: 2021-06-18 | End: 2021-06-18

## 2021-06-18 NOTE — TELEPHONE ENCOUNTER
Dr Chio Aragon calling re: pt's disability from [facility] and ongoing. He has been contracted by [employer].   Pls call to discuss

## 2021-06-21 NOTE — TELEPHONE ENCOUNTER
Received surgery order from Dr. Lalit Goldstein. CPT codes confirmed by Kane Byrd., :    48481, 09652, 25846    DME order initiated for [place] collar. Routed to BEHZAD Whelan, [address] for confirmation of need for order.      Routed to PA team to initiate

## 2021-06-23 NOTE — TELEPHONE ENCOUNTER
Cervical Collar order faxed to [facility] at [address] with all appropriate paperwork.

## 2021-06-24 ENCOUNTER — OFFICE VISIT (OUTPATIENT)
Dept: PHYSICAL THERAPY | Age: 46
End: 2021-06-24
Attending: NEUROLOGICAL SURGERY
Payer: COMMERCIAL

## 2021-06-24 PROCEDURE — 97110 THERAPEUTIC EXERCISES: CPT

## 2021-06-24 NOTE — PROGRESS NOTES
Diagnosis: S/P cervical spinal fusion (1/29/21 C5-6 ACDF), Acute DVT of tibial vein of R LE  Precautions: Cervical fusion C5-6, DVT (stable)           Insurance Type (# Auth): BCBS OOS PPO (20) Treatment time: 40 min             Charges: 3 TherEx  (surgery Cane, abd, AAROM, x20  Cane ext, AAROM, x20   tricep Standing, x10,15#, x10, 7.5# - Supine, x20, 2# Supine, x20, 2#   IR - - - -   , R - - - -   UE WB - *wall push up, x20  *Wall slide, x10 Wall slide, x10 Wall push up, x20   Prone scapular Standing pu

## 2021-06-29 NOTE — TELEPHONE ENCOUNTER
Dr Silva Ground calling again. She states pt's name wasn't given when Dr Lalit Goldstein called.   She stated he needs to call by tomorrow- [phone]

## 2021-07-01 ENCOUNTER — TELEPHONE (OUTPATIENT)
Dept: SURGERY | Facility: CLINIC | Age: 46
End: 2021-07-01

## 2021-07-01 ENCOUNTER — OFFICE VISIT (OUTPATIENT)
Dept: PHYSICAL THERAPY | Age: 46
End: 2021-07-01
Attending: INTERNAL MEDICINE
Payer: COMMERCIAL

## 2021-07-01 PROCEDURE — 97110 THERAPEUTIC EXERCISES: CPT

## 2021-07-01 NOTE — PROGRESS NOTES
Diagnosis: S/P cervical spinal fusion (1/29/21 C5-6 ACDF), Acute DVT of tibial vein of R LE  Precautions: Cervical fusion C5-6, DVT (stable)           Insurance Type (# Auth): BCBS OOS PPO (20) Treatment time: 32 min (decreased treatment time as pt late du 1#  Supine flex, x20, 2# Supine flex, x20, 2# Supine flex, x20, 2# Supine flex, x20, 2#  Supine PNF, x15, 1#   ER, R *X10, ER with elevation, yellow  *x15, 90/90 isometric - X10, ER with elevation, yellow 90/90, x20, yel   abduction - - Cane, abd, AAROM, x

## 2021-07-01 NOTE — TELEPHONE ENCOUNTER
Received Certification of Health Care Provider to Care for Self from pt's HR Dept. Endorsed to RN for review and completion.

## 2021-07-06 ENCOUNTER — APPOINTMENT (OUTPATIENT)
Dept: PHYSICAL THERAPY | Age: 46
End: 2021-07-06
Attending: INTERNAL MEDICINE
Payer: COMMERCIAL

## 2021-07-06 NOTE — TELEPHONE ENCOUNTER
Patient calling to inquire what is the status of paperwork being filled out. Patient would like a call before paperwork started so patient can make sure time off dates are correct.   Patient indicated that his employer has him returning to work on 8/5/2021

## 2021-07-07 NOTE — TELEPHONE ENCOUNTER
Called patient. He will be having surgery on 8-12 and will need to be off 6-8 weeks. Form filled out for patient to be off until 9-28 after his f/u apt with Dr Anup Garner on 9-27. Signed by Dr Sherice Valera. Faxed to [employer]  at [phone].  Confirmation re

## 2021-07-07 NOTE — TELEPHONE ENCOUNTER
Patient calling to follow up on status of paperwork. States he would like to speak with Nurse in regards to ETA of paperwork he requested to be completed STAT. Please contact to advise.

## 2021-07-13 ENCOUNTER — TELEPHONE (OUTPATIENT)
Dept: SURGERY | Facility: CLINIC | Age: 46
End: 2021-07-13

## 2021-07-13 NOTE — TELEPHONE ENCOUNTER
Received medical consult report from [facility], RE: [place] PE:[id number], dated 6/30/2021. Forwarded to provider for review.

## 2021-07-20 ENCOUNTER — OFFICE VISIT (OUTPATIENT)
Dept: PHYSICAL THERAPY | Age: 46
End: 2021-07-20
Attending: INTERNAL MEDICINE
Payer: COMMERCIAL

## 2021-07-20 PROCEDURE — 97110 THERAPEUTIC EXERCISES: CPT

## 2021-07-20 PROCEDURE — 97140 MANUAL THERAPY 1/> REGIONS: CPT

## 2021-07-20 NOTE — PROGRESS NOTES
Diagnosis: S/P cervical spinal fusion (1/29/21 C5-6 ACDF), Acute DVT of tibial vein of R LE  Precautions: Cervical fusion C5-6, DVT (stable)           Insurance Type (# Auth): BCBS OOS PPO (20) Treatment time: 40 min           Charges: 2 TherEx, 1 Man  (raines WNL WNL 5 5     Finger Abd WNL WNL 5 5     Thumb WNL WNL Ext: at [facility] 4+/5 and at [facility] and IP: 4-/5  Flex: 4  Adduction: 4  (*hypersensitivity to touch) 5      7# (hypersensitivity) 69#   * indicates pain    Assessment: Pt has been treated in PT S/P cerv suboccipital, R scalene • STM - suboccipital, R scalene • STM - suboccipital, upper trap, R supinator, thumb extensors, 10 min • STM - suboccipital, upper trap, R supinator, thumb extensors, 10 min   PROM Cervical side bending, upper cervical flexion, 3 mi

## 2021-07-21 RX ORDER — ACETAMINOPHEN 500 MG
1000 TABLET ORAL ONCE
Status: CANCELLED | OUTPATIENT
Start: 2021-07-21 | End: 2021-07-21

## 2021-07-22 ENCOUNTER — TELEPHONE (OUTPATIENT)
Dept: INTERNAL MEDICINE CLINIC | Facility: CLINIC | Age: 46
End: 2021-07-22

## 2021-07-22 NOTE — TELEPHONE ENCOUNTER
LVM for pt to contact office - Pt needs to be scheduled for upcoming surgery with Dr Nabil Martins and Dr Ruthie Harris on 8/12/21

## 2021-07-23 NOTE — TELEPHONE ENCOUNTER
Patient called back and set up an appointment with Dr. Alicia Garza on 08/03 at 12:30pm for his pre op.

## 2021-07-28 NOTE — TELEPHONE ENCOUNTER
Prior authorization request completed for: Posterior cervical three-four, cervical four-five, cervical five-six, cervical six-seven laminoplasty-INTRAOPERATIVE NEURO MONITORING Surgery     Authorization #[id number] (Inpatient Stay Authorization)   Spoke

## 2021-07-29 NOTE — TELEPHONE ENCOUNTER
Authorization dates: 08/17/21 - 08/23/21  [facility] [address] Dept [phone]  Updated New DOS

## 2021-08-03 ENCOUNTER — OFFICE VISIT (OUTPATIENT)
Dept: INTERNAL MEDICINE CLINIC | Facility: CLINIC | Age: 46
End: 2021-08-03
Payer: COMMERCIAL

## 2021-08-03 VITALS
WEIGHT: 183.81 LBS | BODY MASS INDEX: 24.89 KG/M2 | HEIGHT: 72 IN | SYSTOLIC BLOOD PRESSURE: 134 MMHG | DIASTOLIC BLOOD PRESSURE: 70 MMHG | TEMPERATURE: 97 F | RESPIRATION RATE: 18 BRPM | HEART RATE: 70 BPM | OXYGEN SATURATION: 98 %

## 2021-08-03 DIAGNOSIS — M54.12 CERVICAL RADICULOPATHY: Primary | ICD-10-CM

## 2021-08-03 PROCEDURE — 3075F SYST BP GE 130 - 139MM HG: CPT | Performed by: INTERNAL MEDICINE

## 2021-08-03 PROCEDURE — 99214 OFFICE O/P EST MOD 30 MIN: CPT | Performed by: INTERNAL MEDICINE

## 2021-08-03 PROCEDURE — 3008F BODY MASS INDEX DOCD: CPT | Performed by: INTERNAL MEDICINE

## 2021-08-03 PROCEDURE — 3078F DIAST BP <80 MM HG: CPT | Performed by: INTERNAL MEDICINE

## 2021-08-03 RX ORDER — HYDROCODONE BITARTRATE AND ACETAMINOPHEN 10; 325 MG/1; MG/1
1 TABLET ORAL EVERY 8 HOURS PRN
Qty: 40 TABLET | Refills: 0 | Status: ON HOLD | OUTPATIENT
Start: 2021-08-03 | End: 2021-08-21

## 2021-08-03 RX ORDER — PREGABALIN 100 MG/1
100 CAPSULE ORAL 2 TIMES DAILY
Qty: 60 CAPSULE | Refills: 0 | Status: SHIPPED | OUTPATIENT
Start: 2021-08-03

## 2021-08-03 RX ORDER — CYCLOBENZAPRINE HCL 5 MG
5 TABLET ORAL 3 TIMES DAILY PRN
Qty: 30 TABLET | Refills: 0 | Status: ON HOLD | OUTPATIENT
Start: 2021-08-03 | End: 2021-08-21

## 2021-08-03 NOTE — PROGRESS NOTES
Abdiaziz Clay  2/10/1975 is a 55year old male.     Patient presents with:  Pre-Op Exam: 2021-Dr. Briones-Vicente-Posterior Cervical three-four, cervical four fiive-cervical five six , cervical six-seven laminoplasty      HPI:   He  has had pre Cardiovascular:   Patient denies chest pain, shortness of breath/rheumatic fever/murmur/dizzziness cholesterol normal. Leg edema none. Orthopnea none. Palpitations none. PND (paroxsymal nocturnal dyspnea) none.    Gastrointestinal:   Patient denies abdomi ASSESSMENT AND PLAN:   Kimberli Crabtree was seen today for pre-op exam.    Diagnoses and all orders for this visit:    Cervical radiculopathy  -     CBC WITH DIFFERENTIAL WITH PLATELET;  Future  -     MSSA AND MRSA CULTURE SCREEN; Future  -     PROTHROMBI

## 2021-08-09 ENCOUNTER — TELEPHONE (OUTPATIENT)
Dept: SURGERY | Facility: CLINIC | Age: 46
End: 2021-08-09

## 2021-08-09 NOTE — TELEPHONE ENCOUNTER
Called pt back who states that he found the information he was calling about in the [facility] message with sx instructions that was sent to him.  Nothing further needed

## 2021-08-11 ENCOUNTER — TELEPHONE (OUTPATIENT)
Dept: SURGERY | Facility: CLINIC | Age: 46
End: 2021-08-11

## 2021-08-11 NOTE — TELEPHONE ENCOUNTER
Received Certification of Health Care Provider [facility] Serious Health Condition fax [employer] [facility] Leave Management). Endorsed to RN for provider review and signature.

## 2021-08-11 NOTE — TELEPHONE ENCOUNTER
Called pt's mother Antoinette Borges to inform that she will need to sign forms before we can fax. No answer. Left message informing that she will need to come to clinic to sign.  Forms placed in \"Addressed\" bin in nurses station

## 2021-08-11 NOTE — TELEPHONE ENCOUNTER
Form initiated and placed on Dr Waldrop desk. The patient's family member will need to sign as well prior to faxing.

## 2021-08-11 NOTE — TELEPHONE ENCOUNTER
Patient stated he did not go to the ED as directed on Friday. He decided to wait until he has another ultrasound done on 2-17. He denies increased pain or swelling since Friday. He continues to elevate and use heat.   He is scheduled to have the [address] [facility] at an To clarify: is patient to continue 6 units of Levemir at dinner?

## 2021-08-13 ENCOUNTER — LABORATORY ENCOUNTER (OUTPATIENT)
Dept: LAB | Age: 46
End: 2021-08-13
Attending: INTERNAL MEDICINE
Payer: COMMERCIAL

## 2021-08-13 DIAGNOSIS — M54.12 CERVICAL RADICULOPATHY: ICD-10-CM

## 2021-08-13 LAB
ALBUMIN SERPL-MCNC: 3.9 G/DL (ref 3.4–5)
ALBUMIN/GLOB SERPL: 1.2 {RATIO} (ref 1–2)
ALP LIVER SERPL-CCNC: 75 U/L
ALT SERPL-CCNC: 22 U/L
ANION GAP SERPL CALC-SCNC: 5 MMOL/L (ref 0–18)
APTT PPP: 30.9 SECONDS (ref 25.4–36.1)
AST SERPL-CCNC: 12 U/L (ref 15–37)
BASOPHILS # BLD AUTO: 0.04 X10(3) UL (ref 0–0.2)
BASOPHILS NFR BLD AUTO: 0.7 %
BILIRUB SERPL-MCNC: 0.5 MG/DL (ref 0.1–2)
BUN BLD-MCNC: 9 MG/DL (ref 7–18)
CALCIUM BLD-MCNC: 9 MG/DL (ref 8.5–10.1)
CHLORIDE SERPL-SCNC: 110 MMOL/L (ref 98–112)
CO2 SERPL-SCNC: 27 MMOL/L (ref 21–32)
CREAT BLD-MCNC: 0.92 MG/DL
EOSINOPHIL # BLD AUTO: 0.25 X10(3) UL (ref 0–0.7)
EOSINOPHIL NFR BLD AUTO: 4.1 %
ERYTHROCYTE [DISTWIDTH] IN BLOOD BY AUTOMATED COUNT: 13.1 %
GLOBULIN PLAS-MCNC: 3.2 G/DL (ref 2.8–4.4)
GLUCOSE BLD-MCNC: 75 MG/DL (ref 70–99)
HCT VFR BLD AUTO: 48 %
HGB BLD-MCNC: 16.4 G/DL
IMM GRANULOCYTES # BLD AUTO: 0.02 X10(3) UL (ref 0–1)
IMM GRANULOCYTES NFR BLD: 0.3 %
INR BLD: 0.93 (ref 0.89–1.11)
LYMPHOCYTES # BLD AUTO: 1.38 X10(3) UL (ref 1–4)
LYMPHOCYTES NFR BLD AUTO: 22.8 %
M PROTEIN MFR SERPL ELPH: 7.1 G/DL (ref 6.4–8.2)
MCH RBC QN AUTO: 32.7 PG (ref 26–34)
MCHC RBC AUTO-ENTMCNC: 34.2 G/DL (ref 31–37)
MCV RBC AUTO: 95.8 FL
MONOCYTES # BLD AUTO: 0.38 X10(3) UL (ref 0.1–1)
MONOCYTES NFR BLD AUTO: 6.3 %
NEUTROPHILS # BLD AUTO: 3.97 X10 (3) UL (ref 1.5–7.7)
NEUTROPHILS # BLD AUTO: 3.97 X10(3) UL (ref 1.5–7.7)
NEUTROPHILS NFR BLD AUTO: 65.8 %
OSMOLALITY SERPL CALC.SUM OF ELEC: 291 MOSM/KG (ref 275–295)
PATIENT FASTING Y/N/NP: YES
PLATELET # BLD AUTO: 149 10(3)UL (ref 150–450)
POTASSIUM SERPL-SCNC: 3.8 MMOL/L (ref 3.5–5.1)
PSA SERPL DL<=0.01 NG/ML-MCNC: 12.7 SECONDS (ref 12.2–14.5)
RBC # BLD AUTO: 5.01 X10(6)UL
SODIUM SERPL-SCNC: 142 MMOL/L (ref 136–145)
WBC # BLD AUTO: 6 X10(3) UL (ref 4–11)

## 2021-08-13 PROCEDURE — 85610 PROTHROMBIN TIME: CPT

## 2021-08-13 PROCEDURE — 80053 COMPREHEN METABOLIC PANEL: CPT

## 2021-08-13 PROCEDURE — 87081 CULTURE SCREEN ONLY: CPT

## 2021-08-13 PROCEDURE — 85025 COMPLETE CBC W/AUTO DIFF WBC: CPT

## 2021-08-13 PROCEDURE — 36415 COLL VENOUS BLD VENIPUNCTURE: CPT

## 2021-08-13 PROCEDURE — 85730 THROMBOPLASTIN TIME PARTIAL: CPT

## 2021-08-14 ENCOUNTER — LAB ENCOUNTER (OUTPATIENT)
Dept: LAB | Age: 46
End: 2021-08-14
Attending: NEUROLOGICAL SURGERY
Payer: COMMERCIAL

## 2021-08-14 DIAGNOSIS — M54.12 CERVICAL RADICULOPATHY: ICD-10-CM

## 2021-08-14 DIAGNOSIS — G95.9 CERVICAL MYELOPATHY (HCC): ICD-10-CM

## 2021-08-15 LAB — SARS-COV-2 RNA RESP QL NAA+PROBE: NOT DETECTED

## 2021-08-16 ENCOUNTER — TELEPHONE (OUTPATIENT)
Dept: SURGERY | Facility: CLINIC | Age: 46
End: 2021-08-16

## 2021-08-16 NOTE — TELEPHONE ENCOUNTER
Per pt PCP Dr. Ayaz Mares 8/3/21    \"Per protocol labs reviewed.   Patient cleared for surgery\"     PA commend noted     Nothing further needed

## 2021-08-16 NOTE — TELEPHONE ENCOUNTER
Received HR Leave of Absence form from [facility]. Endorsed to [facility].

## 2021-08-16 NOTE — TELEPHONE ENCOUNTER
Form states it needs to be returned to the patient. Called and left message for Barb Akers. Copy placed in mailbox.

## 2021-08-17 ENCOUNTER — APPOINTMENT (OUTPATIENT)
Dept: ULTRASOUND IMAGING | Facility: HOSPITAL | Age: 46
DRG: 516 | End: 2021-08-17
Attending: ANESTHESIOLOGY
Payer: COMMERCIAL

## 2021-08-17 ENCOUNTER — HOSPITAL ENCOUNTER (INPATIENT)
Facility: HOSPITAL | Age: 46
LOS: 4 days | Discharge: HOME | DRG: 516 | End: 2021-08-21
Attending: NEUROLOGICAL SURGERY | Admitting: NEUROLOGICAL SURGERY
Payer: COMMERCIAL

## 2021-08-17 ENCOUNTER — APPOINTMENT (OUTPATIENT)
Dept: GENERAL RADIOLOGY | Facility: HOSPITAL | Age: 46
DRG: 516 | End: 2021-08-17
Attending: NEUROLOGICAL SURGERY
Payer: COMMERCIAL

## 2021-08-17 ENCOUNTER — ANESTHESIA (OUTPATIENT)
Dept: SURGERY | Facility: HOSPITAL | Age: 46
DRG: 516 | End: 2021-08-17
Payer: COMMERCIAL

## 2021-08-17 ENCOUNTER — HOSPITAL ENCOUNTER (EMERGENCY)
Facility: HOSPITAL | Age: 46
Discharge: LEFT BEFORE DISCHARGE | DRG: 516 | End: 2021-08-17
Payer: COMMERCIAL

## 2021-08-17 ENCOUNTER — ANESTHESIA EVENT (OUTPATIENT)
Dept: SURGERY | Facility: HOSPITAL | Age: 46
DRG: 516 | End: 2021-08-17
Payer: COMMERCIAL

## 2021-08-17 DIAGNOSIS — G95.9 CERVICAL MYELOPATHY (HCC): ICD-10-CM

## 2021-08-17 DIAGNOSIS — M54.12 CERVICAL RADICULOPATHY: ICD-10-CM

## 2021-08-17 PROCEDURE — 0PH304Z INSERTION OF INTERNAL FIXATION DEVICE INTO CERVICAL VERTEBRA, OPEN APPROACH: ICD-10-PCS | Performed by: NEUROLOGICAL SURGERY

## 2021-08-17 PROCEDURE — 76000 FLUOROSCOPY <1 HR PHYS/QHP: CPT | Performed by: NEUROLOGICAL SURGERY

## 2021-08-17 PROCEDURE — 4A11X4G MONITORING OF PERIPHERAL NERVOUS ELECTRICAL ACTIVITY, INTRAOPERATIVE, EXTERNAL APPROACH: ICD-10-PCS | Performed by: NEUROLOGICAL SURGERY

## 2021-08-17 PROCEDURE — 93971 EXTREMITY STUDY: CPT | Performed by: ANESTHESIOLOGY

## 2021-08-17 PROCEDURE — 63051 C-LAMINOPLASTY W/GRAFT/PLATE: CPT | Performed by: NEUROLOGICAL SURGERY

## 2021-08-17 PROCEDURE — 99253 IP/OBS CNSLTJ NEW/EST LOW 45: CPT | Performed by: INTERNAL MEDICINE

## 2021-08-17 PROCEDURE — 76942 ECHO GUIDE FOR BIOPSY: CPT | Performed by: ANESTHESIOLOGY

## 2021-08-17 RX ORDER — LIDOCAINE HYDROCHLORIDE 10 MG/ML
INJECTION, SOLUTION EPIDURAL; INFILTRATION; INTRACAUDAL; PERINEURAL AS NEEDED
Status: DISCONTINUED | OUTPATIENT
Start: 2021-08-17 | End: 2021-08-17 | Stop reason: SURG

## 2021-08-17 RX ORDER — HYDROMORPHONE HYDROCHLORIDE 1 MG/ML
0.8 INJECTION, SOLUTION INTRAMUSCULAR; INTRAVENOUS; SUBCUTANEOUS EVERY 2 HOUR PRN
Status: DISCONTINUED | OUTPATIENT
Start: 2021-08-17 | End: 2021-08-17 | Stop reason: ALTCHOICE

## 2021-08-17 RX ORDER — SODIUM CHLORIDE, SODIUM LACTATE, POTASSIUM CHLORIDE, CALCIUM CHLORIDE 600; 310; 30; 20 MG/100ML; MG/100ML; MG/100ML; MG/100ML
INJECTION, SOLUTION INTRAVENOUS CONTINUOUS
Status: DISCONTINUED | OUTPATIENT
Start: 2021-08-17 | End: 2021-08-21

## 2021-08-17 RX ORDER — SODIUM CHLORIDE 9 MG/ML
INJECTION, SOLUTION INTRAVENOUS CONTINUOUS PRN
Status: DISCONTINUED | OUTPATIENT
Start: 2021-08-17 | End: 2021-08-17 | Stop reason: SURG

## 2021-08-17 RX ORDER — DIPHENHYDRAMINE HYDROCHLORIDE 50 MG/ML
12.5 INJECTION INTRAMUSCULAR; INTRAVENOUS AS NEEDED
Status: DISCONTINUED | OUTPATIENT
Start: 2021-08-17 | End: 2021-08-17 | Stop reason: HOSPADM

## 2021-08-17 RX ORDER — DIPHENHYDRAMINE HCL 25 MG
25 CAPSULE ORAL EVERY 4 HOURS PRN
Status: DISCONTINUED | OUTPATIENT
Start: 2021-08-17 | End: 2021-08-21

## 2021-08-17 RX ORDER — CYCLOBENZAPRINE HCL 10 MG
10 TABLET ORAL 3 TIMES DAILY
Status: DISCONTINUED | OUTPATIENT
Start: 2021-08-17 | End: 2021-08-21

## 2021-08-17 RX ORDER — ONDANSETRON 2 MG/ML
4 INJECTION INTRAMUSCULAR; INTRAVENOUS EVERY 4 HOURS PRN
Status: ACTIVE | OUTPATIENT
Start: 2021-08-17 | End: 2021-08-18

## 2021-08-17 RX ORDER — HYDROCODONE BITARTRATE AND ACETAMINOPHEN 5; 325 MG/1; MG/1
2 TABLET ORAL AS NEEDED
Status: DISCONTINUED | OUTPATIENT
Start: 2021-08-17 | End: 2021-08-17 | Stop reason: HOSPADM

## 2021-08-17 RX ORDER — MEPERIDINE HYDROCHLORIDE 25 MG/ML
12.5 INJECTION INTRAMUSCULAR; INTRAVENOUS; SUBCUTANEOUS AS NEEDED
Status: DISCONTINUED | OUTPATIENT
Start: 2021-08-17 | End: 2021-08-17 | Stop reason: HOSPADM

## 2021-08-17 RX ORDER — BUPIVACAINE HYDROCHLORIDE AND EPINEPHRINE 5; 5 MG/ML; UG/ML
INJECTION, SOLUTION EPIDURAL; INTRACAUDAL; PERINEURAL AS NEEDED
Status: DISCONTINUED | OUTPATIENT
Start: 2021-08-17 | End: 2021-08-17 | Stop reason: HOSPADM

## 2021-08-17 RX ORDER — SODIUM CHLORIDE 9 MG/ML
INJECTION, SOLUTION INTRAVENOUS CONTINUOUS
Status: DISCONTINUED | OUTPATIENT
Start: 2021-08-17 | End: 2021-08-21

## 2021-08-17 RX ORDER — ONDANSETRON 2 MG/ML
INJECTION INTRAMUSCULAR; INTRAVENOUS AS NEEDED
Status: DISCONTINUED | OUTPATIENT
Start: 2021-08-17 | End: 2021-08-17 | Stop reason: SURG

## 2021-08-17 RX ORDER — HYDROCODONE BITARTRATE AND ACETAMINOPHEN 10; 325 MG/1; MG/1
2 TABLET ORAL EVERY 4 HOURS PRN
Status: DISCONTINUED | OUTPATIENT
Start: 2021-08-17 | End: 2021-08-21

## 2021-08-17 RX ORDER — POLYETHYLENE GLYCOL 3350 17 G/17G
17 POWDER, FOR SOLUTION ORAL DAILY PRN
Status: DISCONTINUED | OUTPATIENT
Start: 2021-08-17 | End: 2021-08-21

## 2021-08-17 RX ORDER — NALOXONE HYDROCHLORIDE 0.4 MG/ML
0.08 INJECTION, SOLUTION INTRAMUSCULAR; INTRAVENOUS; SUBCUTANEOUS
Status: DISCONTINUED | OUTPATIENT
Start: 2021-08-17 | End: 2021-08-21

## 2021-08-17 RX ORDER — HYDROMORPHONE HYDROCHLORIDE 1 MG/ML
INJECTION, SOLUTION INTRAMUSCULAR; INTRAVENOUS; SUBCUTANEOUS
Status: COMPLETED
Start: 2021-08-17 | End: 2021-08-17

## 2021-08-17 RX ORDER — HYDROCODONE BITARTRATE AND ACETAMINOPHEN 10; 325 MG/1; MG/1
1 TABLET ORAL EVERY 4 HOURS PRN
Status: DISCONTINUED | OUTPATIENT
Start: 2021-08-17 | End: 2021-08-21

## 2021-08-17 RX ORDER — BISACODYL 10 MG
10 SUPPOSITORY, RECTAL RECTAL
Status: DISCONTINUED | OUTPATIENT
Start: 2021-08-17 | End: 2021-08-21

## 2021-08-17 RX ORDER — ACETAMINOPHEN 325 MG/1
650 TABLET ORAL EVERY 4 HOURS PRN
Status: DISCONTINUED | OUTPATIENT
Start: 2021-08-17 | End: 2021-08-21

## 2021-08-17 RX ORDER — DIAZEPAM 5 MG/1
5 TABLET ORAL EVERY 6 HOURS PRN
Status: DISCONTINUED | OUTPATIENT
Start: 2021-08-17 | End: 2021-08-21

## 2021-08-17 RX ORDER — SODIUM PHOSPHATE, DIBASIC AND SODIUM PHOSPHATE, MONOBASIC 7; 19 G/133ML; G/133ML
1 ENEMA RECTAL ONCE AS NEEDED
Status: DISCONTINUED | OUTPATIENT
Start: 2021-08-17 | End: 2021-08-21

## 2021-08-17 RX ORDER — MIDAZOLAM HYDROCHLORIDE 1 MG/ML
1 INJECTION INTRAMUSCULAR; INTRAVENOUS EVERY 5 MIN PRN
Status: DISCONTINUED | OUTPATIENT
Start: 2021-08-17 | End: 2021-08-17 | Stop reason: HOSPADM

## 2021-08-17 RX ORDER — HYDROMORPHONE HYDROCHLORIDE 1 MG/ML
0.4 INJECTION, SOLUTION INTRAMUSCULAR; INTRAVENOUS; SUBCUTANEOUS EVERY 5 MIN PRN
Status: DISCONTINUED | OUTPATIENT
Start: 2021-08-17 | End: 2021-08-17 | Stop reason: HOSPADM

## 2021-08-17 RX ORDER — CEFAZOLIN SODIUM/WATER 2 G/20 ML
2 SYRINGE (ML) INTRAVENOUS ONCE
Status: COMPLETED | OUTPATIENT
Start: 2021-08-17 | End: 2021-08-17

## 2021-08-17 RX ORDER — SODIUM CHLORIDE, SODIUM LACTATE, POTASSIUM CHLORIDE, CALCIUM CHLORIDE 600; 310; 30; 20 MG/100ML; MG/100ML; MG/100ML; MG/100ML
INJECTION, SOLUTION INTRAVENOUS CONTINUOUS
Status: DISCONTINUED | OUTPATIENT
Start: 2021-08-17 | End: 2021-08-17 | Stop reason: HOSPADM

## 2021-08-17 RX ORDER — DOCUSATE SODIUM 100 MG/1
100 CAPSULE, LIQUID FILLED ORAL 2 TIMES DAILY
Status: DISCONTINUED | OUTPATIENT
Start: 2021-08-17 | End: 2021-08-21

## 2021-08-17 RX ORDER — NALOXONE HYDROCHLORIDE 0.4 MG/ML
80 INJECTION, SOLUTION INTRAMUSCULAR; INTRAVENOUS; SUBCUTANEOUS AS NEEDED
Status: DISCONTINUED | OUTPATIENT
Start: 2021-08-17 | End: 2021-08-17 | Stop reason: HOSPADM

## 2021-08-17 RX ORDER — SENNOSIDES 8.6 MG
17.2 TABLET ORAL NIGHTLY
Status: DISCONTINUED | OUTPATIENT
Start: 2021-08-17 | End: 2021-08-21

## 2021-08-17 RX ORDER — PREGABALIN 100 MG/1
100 CAPSULE ORAL 2 TIMES DAILY
Status: DISCONTINUED | OUTPATIENT
Start: 2021-08-17 | End: 2021-08-21

## 2021-08-17 RX ORDER — DIPHENHYDRAMINE HYDROCHLORIDE 50 MG/ML
25 INJECTION INTRAMUSCULAR; INTRAVENOUS EVERY 4 HOURS PRN
Status: DISCONTINUED | OUTPATIENT
Start: 2021-08-17 | End: 2021-08-21

## 2021-08-17 RX ORDER — HYDROCODONE BITARTRATE AND ACETAMINOPHEN 5; 325 MG/1; MG/1
1 TABLET ORAL AS NEEDED
Status: DISCONTINUED | OUTPATIENT
Start: 2021-08-17 | End: 2021-08-17 | Stop reason: HOSPADM

## 2021-08-17 RX ORDER — DIPHENHYDRAMINE HYDROCHLORIDE 50 MG/ML
12.5 INJECTION INTRAMUSCULAR; INTRAVENOUS EVERY 4 HOURS PRN
Status: DISCONTINUED | OUTPATIENT
Start: 2021-08-17 | End: 2021-08-21

## 2021-08-17 RX ORDER — HYDROMORPHONE HYDROCHLORIDE 1 MG/ML
0.2 INJECTION, SOLUTION INTRAMUSCULAR; INTRAVENOUS; SUBCUTANEOUS EVERY 2 HOUR PRN
Status: DISCONTINUED | OUTPATIENT
Start: 2021-08-17 | End: 2021-08-17 | Stop reason: ALTCHOICE

## 2021-08-17 RX ORDER — METOCLOPRAMIDE HYDROCHLORIDE 5 MG/ML
INJECTION INTRAMUSCULAR; INTRAVENOUS AS NEEDED
Status: DISCONTINUED | OUTPATIENT
Start: 2021-08-17 | End: 2021-08-17 | Stop reason: SURG

## 2021-08-17 RX ORDER — PROCHLORPERAZINE EDISYLATE 5 MG/ML
10 INJECTION INTRAMUSCULAR; INTRAVENOUS EVERY 6 HOURS PRN
Status: ACTIVE | OUTPATIENT
Start: 2021-08-17 | End: 2021-08-19

## 2021-08-17 RX ORDER — LABETALOL HYDROCHLORIDE 5 MG/ML
5 INJECTION, SOLUTION INTRAVENOUS EVERY 5 MIN PRN
Status: DISCONTINUED | OUTPATIENT
Start: 2021-08-17 | End: 2021-08-17 | Stop reason: HOSPADM

## 2021-08-17 RX ORDER — ONDANSETRON 2 MG/ML
4 INJECTION INTRAMUSCULAR; INTRAVENOUS EVERY 6 HOURS PRN
Status: DISCONTINUED | OUTPATIENT
Start: 2021-08-18 | End: 2021-08-21

## 2021-08-17 RX ORDER — CEFAZOLIN SODIUM/WATER 2 G/20 ML
2 SYRINGE (ML) INTRAVENOUS EVERY 8 HOURS
Status: COMPLETED | OUTPATIENT
Start: 2021-08-17 | End: 2021-08-18

## 2021-08-17 RX ORDER — REMIFENTANIL HYDROCHLORIDE 1 MG/ML
INJECTION, POWDER, LYOPHILIZED, FOR SOLUTION INTRAVENOUS AS NEEDED
Status: DISCONTINUED | OUTPATIENT
Start: 2021-08-17 | End: 2021-08-17 | Stop reason: SURG

## 2021-08-17 RX ORDER — ONDANSETRON 2 MG/ML
4 INJECTION INTRAMUSCULAR; INTRAVENOUS AS NEEDED
Status: DISCONTINUED | OUTPATIENT
Start: 2021-08-17 | End: 2021-08-17 | Stop reason: HOSPADM

## 2021-08-17 RX ORDER — HYDROMORPHONE HYDROCHLORIDE 1 MG/ML
0.4 INJECTION, SOLUTION INTRAMUSCULAR; INTRAVENOUS; SUBCUTANEOUS EVERY 2 HOUR PRN
Status: DISCONTINUED | OUTPATIENT
Start: 2021-08-17 | End: 2021-08-17 | Stop reason: ALTCHOICE

## 2021-08-17 RX ADMIN — CEFAZOLIN SODIUM/WATER 2 G: 2 G/20 ML SYRINGE (ML) INTRAVENOUS at 09:02:00

## 2021-08-17 RX ADMIN — LIDOCAINE HYDROCHLORIDE 50 MG: 10 INJECTION, SOLUTION EPIDURAL; INFILTRATION; INTRACAUDAL; PERINEURAL at 08:33:00

## 2021-08-17 RX ADMIN — METOCLOPRAMIDE HYDROCHLORIDE 10 MG: 5 INJECTION INTRAMUSCULAR; INTRAVENOUS at 09:28:00

## 2021-08-17 RX ADMIN — SODIUM CHLORIDE: 9 INJECTION, SOLUTION INTRAVENOUS at 12:04:00

## 2021-08-17 RX ADMIN — REMIFENTANIL HYDROCHLORIDE 200 MCG: 1 INJECTION, POWDER, LYOPHILIZED, FOR SOLUTION INTRAVENOUS at 08:33:00

## 2021-08-17 RX ADMIN — ONDANSETRON 4 MG: 2 INJECTION INTRAMUSCULAR; INTRAVENOUS at 11:57:00

## 2021-08-17 RX ADMIN — SODIUM CHLORIDE: 9 INJECTION, SOLUTION INTRAVENOUS at 08:28:00

## 2021-08-17 NOTE — ED QUICK NOTES
Patient has surgery scheduled at 0530 he decided to leave and call his surgeon  Charge RN was notified

## 2021-08-17 NOTE — ANESTHESIA PROCEDURE NOTES
Arterial Line  Performed by: Jim Bryant MD  Authorized by: Jim Bryant MD     General Information and Staff    Procedure Start:  8/17/2021 8:37 AM  Procedure End:  8/17/2021 8:46 AM  Anesthesiologist:  Jim Bryant MD  Performed By:  Wilmer Villegas

## 2021-08-17 NOTE — ANESTHESIA PREPROCEDURE EVALUATION
PRE-OP EVALUATION    Patient Name: Cherelle Barraza    Admit Diagnosis: Cervical myelopathy ([address] [G95.9]  Cervical radiculopathy [M54.12]    Pre-op Diagnosis: Cervical myelopathy ([address] [G95.9]  Cervical radiculopathy [M54.12]    Posterior cervical three function was   vigorous. The estimated ejection fraction was 70%. There was no diagnostic   evidence for regional wall motion abnormalities.  Features are consistent   with a pseudonormal left ventricular filling pattern, with concomitant   abnormal relaxat (+) clotting disorder (DVT after Jan 2021 surgery, no anticoagulants given)             Pulmonary  Comment: 0.5 PPD smoking history                           Neuro/Psych                 (+) neuromuscular disease (neuropathy/myelopathy) Pulmonary    Pulmonary exam normal.                 Other findings            ASA: 2   Plan: general  NPO status verified and patient meets guidelines. Post-procedure pain management plan discussed with surgeon and patient.     Comment: Garry Callahan

## 2021-08-17 NOTE — ANESTHESIA PROCEDURE NOTES
Airway  Date/Time: 8/17/2021 8:34 AM  Urgency: elective      General Information and Staff    Patient location during procedure: OR  Anesthesiologist: Haja Bethea MD  Performed: anesthesiologist     Indications and Patient Condition  Indications for air

## 2021-08-17 NOTE — CONSULTS
[facility]  CONSULT     Kennedi North Patient Status:  Inpatient    2/10/1975 MRN [id number]   [facility] 3SW-A Attending Sue Medellin MD   Hosp Day # 0 PCP Dean Guzman MD     Reason for consult: med management    Reques one time. , Disp: , Rfl:         Review of Systems:   A comprehensive 14 point review of systems was completed. Pertinent positives and negatives noted in the HPI.     Physical Exam:    /64 (BP Location: Right arm)   Pulse 67   Temp 98.6 °F (37 °C) data reviewed in Epic. ASSESSMENT / PLAN:     1. Cervical Myelopathy/Cervical Radiculopathy  1. POD 0  2. Pain control  3. Per NS  4. PT/OT in AM  2. Hx Of DVT  1. Not on AC at home  2.  Was after surgery back in 1/2021    Quality:  · DVT Prophylaxis:

## 2021-08-17 NOTE — ANESTHESIA POSTPROCEDURE EVALUATION
[address] Patient Status:  Inpatient   Age/Gender 55year old male MRN [id number]   Location [address] [facility] Attending Arcadio Montano MD   Hosp Day # 0 PCP Julia No MD       Anesthesia Post-op Note

## 2021-08-17 NOTE — PLAN OF CARE
Received pt from pacu at 1430. Moves all extremities, right arm slightly weaker than left. C/o numbness to right fingers. Using Pca Dilaudid for pain control. Flores intact and patent. Dressing dry and intact to posterior neck with COMPA to bulb suction.

## 2021-08-17 NOTE — OPERATIVE REPORT
[facility]    OPERATIVE REPORT    Patient:  Marcus John;  YOB: 1975     CSN:  [id number]; Medical Record Number:  [id number]    Admission Date:  8/17/2021 Operation Date:  8/17/2021    . ..........................     Operating Phys the C3 to C7 levels including the entire lamina and bilateral lateral masses. Lateral x-ray confirmation of the exposed levels was obtained.    Using the high speed drill, a trough was drilled in the lateral lamina on the left side of the cervical spine to

## 2021-08-17 NOTE — BRIEF OP NOTE
Pre-Operative Diagnosis: Cervical myelopathy (HCC) [G95.9]  Cervical radiculopathy [M54.12]     Post-Operative Diagnosis: Cervical myelopathy ([address] [G95. 9]Cervical radiculopathy [M54.12]      Procedure Performed:   Posterior cervical three-four, cervical f

## 2021-08-17 NOTE — H&P
Neurosurgery Pre-OP H&P  2021    Bhargavi Gramajo PCP:  Yves Paiz MD    2/10/1975 MRN [id number]       HISTORY OF PRESENT ILLNESS:  Bhargavi Gramajo is a(n) 55year old male here for follow-up after C5-6 ACDF for severe radiculopathy.  Since HISTORY:   reports that he has been smoking. He has been smoking about 0.50 packs per day. He has never used smokeless tobacco. He reports current alcohol use.  He reports that he does not use drugs.     ALLERGIES:  No Known Allergies     MEDICATIONS:    Pr post-op distal RLE thrombosis which did no require anticoagulation. Plan for pre-op US to rule out DVT. Proceed with C3-C7 laminoplasty as scheduled with Dr. Tiffanie Fatima and Dr. Joi Mcguire.   The risks, indication, options, and benefits of the operation were d

## 2021-08-18 PROCEDURE — 99232 SBSQ HOSP IP/OBS MODERATE 35: CPT | Performed by: INTERNAL MEDICINE

## 2021-08-18 NOTE — PLAN OF CARE
Pt moving and walking with min assist.  C/o muscle spasms to right shoulder. Taking flexeril and valium as needed with relief. Using PCA dilaudid for pain control. Dressing dry and intact to posterior neck with COMPA intact and draining.   Seen by Dr. Damaris Melendrez

## 2021-08-18 NOTE — PROGRESS NOTES
Patient sitting up in chair. Reviewed swallowing precautions. Assisted patient to order food as he states he really did not eat anything because he does not have any appetite. Encouraged him to at least pick at some foods. He states he will try.  Reviewed i

## 2021-08-18 NOTE — PHYSICAL THERAPY NOTE
PHYSICAL THERAPY EVALUATION - INPATIENT     Room Number: 361/361-A  Evaluation Date: 8/18/2021  Type of Evaluation: Initial  Physician Order: PT Eval and Treat    Presenting Problem: s/p posterior C3-7 laminectomy 8/17/21  Reason for Therapy: Yoshi Watson 9  Location: [facility] upper traps  Management Techniques: Activity promotion; Body mechanics    COGNITION  · Overall Cognitive Status:  WFL - within functional limits  · lethargic    RANGE OF MOTION AND STRENGTH ASSESSMENT  Upper extremity ROM and strength are administration. Pt stood to rw with cga, gait training with rw with emphasis on upright posture, least amount of pressure on UE for rw use as pt with good balance, using rw for energy conservation.   Pt motivated to amb despite high pain level, reporting p considered low. DISCHARGE RECOMMENDATIONS  PT Discharge Recommendations: Home (with spouse assist as needed)    PLAN  PT Treatment Plan: Bed mobility; Body mechanics; Endurance; Energy conservation;Patient education;Transfer training;Stair training  Rehab Po

## 2021-08-18 NOTE — PROGRESS NOTES
[facility]  Progress Note     Tamararaquel Hoffman Patient Status:  Inpatient    2/10/1975 MRN [id number]   [facility] 3SW-A Attending Gelacio Jones MD   Hosp Day # 1 PCP Eugene Rand MD     Chief Complaint: med management    S: Pa Pro-Calcitonin  No results for input(s): PCT in the last 168 hours. Cardiac  No results for input(s): TROP, PBNP in the last 168 hours. Creatinine Kinase  No results for input(s): CK in the last 168 hours.     Inflammatory Markers  No results fo

## 2021-08-18 NOTE — PLAN OF CARE
PT alert and oriented. VSS on RA. , IS. SCDS bilaterally. Voiding with ewing, will take out in AM. Ambulating with walker, gait belt, one assist. Dilaudid PCA infusing. COMPA harris to suction. Dressing CDI. Wife at bedside.  Safety precautions in place, chen

## 2021-08-18 NOTE — OCCUPATIONAL THERAPY NOTE
OCCUPATIONAL THERAPY EVALUATION - INPATIENT     Room Number: 361/361-A  Evaluation Date: 8/18/2021  Type of Evaluation: Initial  Presenting Problem: s/p posterior C3-C7 lami 8/17/21    Physician Order: IP Consult to Occupational Therapy  Reason for Therapy RESTRICTION  Weight Bearing Restriction: None                PAIN ASSESSMENT  Rating: 10  Location: back of neck, shoulders  Management Techniques: Activity promotion; Body mechanics;Breathing techniques;Relaxation;Repositioning    COGNITION  Overall Cognit precautions, body mechanics, neck/back protection principles, and incorporation into ADLs and functional transfers; patient required min cueing to follow throughout session; standing via RW and CGA with greatly increased time; functional mobility to bathro appropriate for return home by time of discharge pending improved pain control, however will continue to follow.     The patient is functioning below his previous functional level and would benefit from skilled inpatient OT to address the above deficits, ma

## 2021-08-18 NOTE — PROGRESS NOTES
[facility]  Neurosurgery Progress Note    Mona Petersongautam Patient Status:  Inpatient    2/10/1975 MRN [id number]   [facility] 3SW-A Attending Pawel Pineda MD   Hosp Day # 1 PCP Neda Munson MD     Chief Complaint:  No chief comp [id number]

## 2021-08-19 ENCOUNTER — APPOINTMENT (OUTPATIENT)
Dept: GENERAL RADIOLOGY | Facility: HOSPITAL | Age: 46
DRG: 516 | End: 2021-08-19
Attending: NEUROLOGICAL SURGERY
Payer: COMMERCIAL

## 2021-08-19 PROCEDURE — 99232 SBSQ HOSP IP/OBS MODERATE 35: CPT | Performed by: INTERNAL MEDICINE

## 2021-08-19 PROCEDURE — 72040 X-RAY EXAM NECK SPINE 2-3 VW: CPT | Performed by: NEUROLOGICAL SURGERY

## 2021-08-19 RX ORDER — CEFAZOLIN SODIUM/WATER 2 G/20 ML
2 SYRINGE (ML) INTRAVENOUS EVERY 8 HOURS
Status: DISCONTINUED | OUTPATIENT
Start: 2021-08-19 | End: 2021-08-20

## 2021-08-19 RX ORDER — HYDROMORPHONE HYDROCHLORIDE 1 MG/ML
0.4 INJECTION, SOLUTION INTRAMUSCULAR; INTRAVENOUS; SUBCUTANEOUS EVERY 2 HOUR PRN
Status: DISCONTINUED | OUTPATIENT
Start: 2021-08-19 | End: 2021-08-21

## 2021-08-19 RX ORDER — HYDROMORPHONE HYDROCHLORIDE 1 MG/ML
0.8 INJECTION, SOLUTION INTRAMUSCULAR; INTRAVENOUS; SUBCUTANEOUS EVERY 2 HOUR PRN
Status: DISCONTINUED | OUTPATIENT
Start: 2021-08-19 | End: 2021-08-21

## 2021-08-19 RX ORDER — HYDROMORPHONE HYDROCHLORIDE 1 MG/ML
0.2 INJECTION, SOLUTION INTRAMUSCULAR; INTRAVENOUS; SUBCUTANEOUS EVERY 2 HOUR PRN
Status: DISCONTINUED | OUTPATIENT
Start: 2021-08-19 | End: 2021-08-21

## 2021-08-19 NOTE — OCCUPATIONAL THERAPY NOTE
OCCUPATIONAL THERAPY TREATMENT NOTE - INPATIENT     Room Number: 361/361-A  Session: 1   Number of Visits to Meet Established Goals: 3    Presenting Problem: s/p posterior C3-C7 lami 8/17/21    ASSESSMENT     Patient is a 55year old male admitted on 8/17/ of OT, Safety with ADL and transfers, Activity recommendations, pain management, breathing techniques, no sudden/extreme movements  Verbalized understanding    Equipment used: RW  Demonstrates functional use     Therapist comments: increased time for all a

## 2021-08-19 NOTE — PLAN OF CARE
PT alert and oriented. VSS on RA. Temp elevatedtonight, tylenol given, monitoring. Scheduled flexaril and dilaudid pca to control pain. Prn valium and norco. Dressing cdi. Drain to suction. Sensation to R fingertips improving per pt.  Up voiding in bathroom

## 2021-08-19 NOTE — PLAN OF CARE
Pt  is AOx4, on R. A.  VS are stable, voiding freely. Coverlet dressing is CDI. has the COMPA drain to suction. Pt c/o moderate pain mostly spasming in his shoulders. Started on PO pain medication today and will stop the PCA in the afternoon.    Up with P

## 2021-08-19 NOTE — PROGRESS NOTES
[facility]  Progress Note     Replaced by [facility] Patient Status:  Inpatient    2/10/1975 MRN [id number]   [facility] 3SW-A Attending Para Libman, MD   Hosp Day # 2 PCP Warren Benjamin MD     Chief Complaint: med management    S: Pa Pro-Calcitonin  No results for input(s): PCT in the last 168 hours. Cardiac  No results for input(s): TROP, PBNP in the last 168 hours. Creatinine Kinase  No results for input(s): CK in the last 168 hours.     Inflammatory Markers  No results fo

## 2021-08-19 NOTE — PROGRESS NOTES
[facility]  Neurosurgery Progress Note    Tamara Hoffman Patient Status:  Inpatient    2/10/1975 MRN [id number]   [facility] 3SW-A Attending Gelcaio Jones MD   Hosp Day # 2 PCP Eugene Rand MD     Chief Complaint:  No chief comp

## 2021-08-19 NOTE — PHYSICAL THERAPY NOTE
PHYSICAL THERAPY TREATMENT NOTE - INPATIENT    Room Number: 361/361-A     Session: 1  Number of Visits to Meet Established Goals: 3    Presenting Problem: s/p posterior C3-7 laminectomy 8/17/21    Problem List  Active Problems:    * No active hospital pro lying on back to sitting on the side of the bed?: None   How much help from another person does the patient currently need. ..   -   Moving to and from a bed to a chair (including a wheelchair)?: A Little   -   Need to walk in hospital room?: A Little   - education;Transfer training;Stair training  Rehab Potential : Good  Frequency (Obs): Daily    CURRENT GOALS   Goal #1 Patient is able to demonstrate supine - sit EOB @ level: supervision      Goal #2 Patient is able to demonstrate transfers Sit to/from [facility]

## 2021-08-19 NOTE — PROGRESS NOTES
Patient resting in bed. PCA still in use. Reviewed indications, side effects of pain medication/narcotics and constipation prevention.  Stressed importance of increased fluids/roughage in diet, continued use stool softeners along with laxatives and supposit

## 2021-08-20 PROCEDURE — 99232 SBSQ HOSP IP/OBS MODERATE 35: CPT | Performed by: INTERNAL MEDICINE

## 2021-08-20 RX ORDER — HEPARIN SODIUM 5000 [USP'U]/ML
5000 INJECTION, SOLUTION INTRAVENOUS; SUBCUTANEOUS EVERY 8 HOURS SCHEDULED
Status: DISCONTINUED | OUTPATIENT
Start: 2021-08-21 | End: 2021-08-21

## 2021-08-20 NOTE — PROGRESS NOTES
Patient assisted with standby assist to bathroom and back to bed. Reviewed indications, side effects of pain medication/narcotics and constipation prevention.  Stressed importance of increased fluids/roughage in diet, continued use stool softeners along wit

## 2021-08-20 NOTE — PROGRESS NOTES
[facility]  Neurosurgery Progress Note    Durward Conception Patient Status:  Inpatient    2/10/1975 MRN [id number]   [facility] 3SW-A Attending Arcadio Montano MD   Hosp Day # 3 PCP Julia No MD     Chief Complaint:  No chief comp ambulate TID  Medical management per hospitalist  DVT prophylaxis- SCDs TEDs ok for Heparin tomorrow  [id number] Saadia Ruffin discharge once pain controlled, likely tomorrow      NERY Owens [facility]  8/20/2021, 9:50 AM      I have seen and examin

## 2021-08-20 NOTE — PLAN OF CARE
Pt  is AOx4, on R. A.  VS are stable, voiding freely. Coverlet dressing changed today COMPA drain was removed by surgeon   Pt c/o moderate pain mostly spasming in his shoulders, Reports pain improved today.  Has been up walking in the hallway,  tolerated well

## 2021-08-20 NOTE — PROGRESS NOTES
[facility]  Progress Note     Bhargavi Gramajo Patient Status:  Inpatient    2/10/1975 MRN [id number]   [facility] 3SW-A Attending Caesar Adams MD   Hosp Day # 3 PCP Yves Paiz MD     Chief Complaint: med management    S: Pa last 168 hours. Cardiac  No results for input(s): TROP, PBNP in the last 168 hours. Creatinine Kinase  No results for input(s): CK in the last 168 hours. Inflammatory Markers  No results for input(s): CRP, MARY, LDH, DDIMER in the last 168 hours.

## 2021-08-20 NOTE — PLAN OF CARE
Alert and oriented,V/S stable,posterior neck dressing dry/intact. COMPA drain to suction,output monitored and recorded. Up with assist to void in the bathroom,safety precautions reinforced. Still having pain and spasm on  his right shoulder,per patient the docto

## 2021-08-21 VITALS
WEIGHT: 185.44 LBS | HEIGHT: 72 IN | BODY MASS INDEX: 25.12 KG/M2 | DIASTOLIC BLOOD PRESSURE: 60 MMHG | SYSTOLIC BLOOD PRESSURE: 112 MMHG | RESPIRATION RATE: 16 BRPM | TEMPERATURE: 98 F | OXYGEN SATURATION: 98 % | HEART RATE: 72 BPM

## 2021-08-21 PROCEDURE — 99231 SBSQ HOSP IP/OBS SF/LOW 25: CPT | Performed by: INTERNAL MEDICINE

## 2021-08-21 RX ORDER — NALOXONE HYDROCHLORIDE 4 MG/.1ML
4 SPRAY, METERED NASAL AS NEEDED
Qty: 1 KIT | Refills: 0 | Status: SHIPPED | OUTPATIENT
Start: 2021-08-21

## 2021-08-21 RX ORDER — CYCLOBENZAPRINE HCL 5 MG
10 TABLET ORAL 3 TIMES DAILY PRN
Qty: 30 TABLET | Refills: 1 | Status: SHIPPED | OUTPATIENT
Start: 2021-08-21

## 2021-08-21 RX ORDER — DIAZEPAM 10 MG/1
5 TABLET ORAL EVERY 8 HOURS PRN
Qty: 45 TABLET | Refills: 0 | Status: SHIPPED | OUTPATIENT
Start: 2021-08-21

## 2021-08-21 RX ORDER — PSEUDOEPHEDRINE HCL 30 MG
100 TABLET ORAL 2 TIMES DAILY PRN
Qty: 40 CAPSULE | Refills: 0 | Status: SHIPPED | OUTPATIENT
Start: 2021-08-21

## 2021-08-21 RX ORDER — HYDROCODONE BITARTRATE AND ACETAMINOPHEN 10; 325 MG/1; MG/1
1-2 TABLET ORAL EVERY 4 HOURS PRN
Qty: 90 TABLET | Refills: 0 | Status: SHIPPED | OUTPATIENT
Start: 2021-08-21 | End: 2021-08-27

## 2021-08-21 NOTE — PLAN OF CARE
Pt AOx4. R.A. Pain to bilateral shoulders/R arm moderately relieved with PO pain medication and scheduled muscle relaxers.  Coverlet dressing, C/D/I, to be changed in AM. WBAT, Up w/ standby assist. VS remain stable, voiding freely, last BM 8/16/2021 - pt r

## 2021-08-21 NOTE — DISCHARGE SUMMARY
[facility]  Discharge Summary    Taylor Perez Patient Status:  Inpatient    2/10/1975 MRN [id number]   [facility] 3SW-A Attending Cyndi Castillo MD   Hosp Day # 4 PCP Rolando Putnam MD     Date of Admission: 2021    Date of agree with the plan, verbalized understanding and were appreciative.     Complications: No apparent complications    Discharge Condition: Good    Disposition: Left Without Being Seen    Discharge Medications: Current Discharge Medication List    START joe [address] [id number] [facility], [address], [address]  [phone]  8/21/2021 9:57 AM    Dragon speech recognition software was used to prepare this note.  If a word or phrase is confusing, it is likely due to a failure of recognitio

## 2021-08-21 NOTE — PROGRESS NOTES
[facility]  Progress Note     Tamararaquel Hoffman Patient Status:  Inpatient    2/10/1975 MRN [id number]   [facility] 3SW-A Attending Gelacio Jones MD   Hosp Day # 4 PCP Eugene Rand MD     Chief Complaint: med management    S: Pa hours. Cardiac  No results for input(s): TROP, PBNP in the last 168 hours. Creatinine Kinase  No results for input(s): CK in the last 168 hours. Inflammatory Markers  No results for input(s): CRP, MARY, LDH, DDIMER in the last 168 hours.     Imaging

## 2021-08-25 NOTE — TELEPHONE ENCOUNTER
Received completed and signed forms from Dr. Julia Weinstein made     Sent to scan     Faxed forms to [phone] as requested.  Confirmation received     Messaged pt via [facility] to inform     Mailed original to pt's home address

## 2021-08-27 ENCOUNTER — OFFICE VISIT (OUTPATIENT)
Dept: SURGERY | Facility: CLINIC | Age: 46
End: 2021-08-27
Payer: COMMERCIAL

## 2021-08-27 VITALS
WEIGHT: 185 LBS | BODY MASS INDEX: 25.06 KG/M2 | HEIGHT: 72 IN | SYSTOLIC BLOOD PRESSURE: 110 MMHG | DIASTOLIC BLOOD PRESSURE: 68 MMHG

## 2021-08-27 DIAGNOSIS — M54.12 CERVICAL RADICULOPATHY: ICD-10-CM

## 2021-08-27 DIAGNOSIS — G95.9 CERVICAL MYELOPATHY (HCC): Primary | ICD-10-CM

## 2021-08-27 PROCEDURE — 3074F SYST BP LT 130 MM HG: CPT | Performed by: PHYSICIAN ASSISTANT

## 2021-08-27 PROCEDURE — 3078F DIAST BP <80 MM HG: CPT | Performed by: PHYSICIAN ASSISTANT

## 2021-08-27 PROCEDURE — 3008F BODY MASS INDEX DOCD: CPT | Performed by: PHYSICIAN ASSISTANT

## 2021-08-27 PROCEDURE — 99024 POSTOP FOLLOW-UP VISIT: CPT | Performed by: PHYSICIAN ASSISTANT

## 2021-08-27 RX ORDER — HYDROCODONE BITARTRATE AND ACETAMINOPHEN 10; 325 MG/1; MG/1
1-2 TABLET ORAL EVERY 4 HOURS PRN
Qty: 90 TABLET | Refills: 0 | Status: SHIPPED | OUTPATIENT
Start: 2021-08-27

## 2021-08-27 NOTE — PROGRESS NOTES
Having a lot of muscle spasms  This seems to be the main issue  Pain from prior to sx appears to be getting better

## 2021-08-27 NOTE — PROGRESS NOTES
Neurosurgery Clinic Visit  2021    Marcus John PCP:  Cy Feliz MD    2/10/1975 MRN [id number]       CC:  S/P C3-7 laminoplasty 21 Dr. Abdelrahman Burt and Dr. Mendoza Oconnor    HPI:    Price Pedroza is here for 2 week post op appt.   He is recovering well use drugs.     ALLERGIES:  No Known Allergies     MEDICATIONS:    Prescriptions Prior to Admission   (Not in a hospital admission)      No current facility-administered medications for this visit.        REVIEW OF SYSTEMS:  A 10-point system was reviewed. scheduled next month with Dr. Raymond Ganser, PA-C  [facility]  8/27/2021  3:09 PM

## 2021-09-22 ENCOUNTER — TELEPHONE (OUTPATIENT)
Dept: SURGERY | Facility: CLINIC | Age: 46
End: 2021-09-22

## 2021-09-22 NOTE — TELEPHONE ENCOUNTER
Received Certification of Health Care Provider to Care for Self form from [employer]ily Hwang.   Endorsed to RN for completion

## 2021-09-27 ENCOUNTER — TELEPHONE (OUTPATIENT)
Dept: SURGERY | Facility: CLINIC | Age: 46
End: 2021-09-27

## 2021-09-27 NOTE — TELEPHONE ENCOUNTER
Picked up forms from RN folder and placed in accordion file for pts next appt on 10/6/21 with Dr Racheal Patton

## 2021-09-27 NOTE — TELEPHONE ENCOUNTER
Received Release to Work Physician Statement from [employer]. Endorsed to provider for completion.

## 2021-10-06 ENCOUNTER — OFFICE VISIT (OUTPATIENT)
Dept: SURGERY | Facility: CLINIC | Age: 46
End: 2021-10-06
Payer: COMMERCIAL

## 2021-10-06 VITALS
BODY MASS INDEX: 25.06 KG/M2 | SYSTOLIC BLOOD PRESSURE: 136 MMHG | WEIGHT: 185 LBS | DIASTOLIC BLOOD PRESSURE: 76 MMHG | HEIGHT: 72 IN

## 2021-10-06 DIAGNOSIS — G95.9 CERVICAL MYELOPATHY (HCC): ICD-10-CM

## 2021-10-06 DIAGNOSIS — Z98.1 S/P CERVICAL SPINAL FUSION: Primary | ICD-10-CM

## 2021-10-06 PROCEDURE — 3008F BODY MASS INDEX DOCD: CPT | Performed by: NEUROLOGICAL SURGERY

## 2021-10-06 PROCEDURE — 3078F DIAST BP <80 MM HG: CPT | Performed by: NEUROLOGICAL SURGERY

## 2021-10-06 PROCEDURE — 99024 POSTOP FOLLOW-UP VISIT: CPT | Performed by: NEUROLOGICAL SURGERY

## 2021-10-06 PROCEDURE — 3075F SYST BP GE 130 - 139MM HG: CPT | Performed by: NEUROLOGICAL SURGERY

## 2021-10-06 NOTE — PROGRESS NOTES
[facility]  Neurological Surgery Follow Up Clinic Note    Frankey Slice 55year old, male    2/10/1975 MRN [id number]   PCP Eboni Krueger MD Allergies No Known Allergies     SUBJECTIVE:  Aure Valero is S/P C3-7 laminoplasty 21 b emphysematous changes in the posterior soft tissues are noted.  Prevertebral soft tissues are within normal limits.  Predental space is unremarkable. DIAGNOSIS:  1. S/P cervical spinal fusion    2. Cervical myelopathy (HCC)     PLAN:  1.  I will not pres

## 2021-10-06 NOTE — PROGRESS NOTES
Pt is here regarding: post op neck     Pain level at this moment:  3/10    Sheyla Krysten states he is doing better after the sx             He is having n/t on right hand, throughout the day will flare up

## 2021-10-06 NOTE — PROGRESS NOTES
Disability paperwork from patient reviewed and signed by Dr. Joi Mcguire has been copied and sent to scan to chart. Original handed back to patient.

## 2021-10-15 ENCOUNTER — TELEPHONE (OUTPATIENT)
Dept: SURGERY | Facility: CLINIC | Age: 46
End: 2021-10-15

## 2021-10-15 NOTE — TELEPHONE ENCOUNTER
Reviewed charting from [address] [place] [address] with Dr. Joi Mcguire. Copy had been made of [facility] documents and sent to scan, original was handed back to patient. Per patient request, documents were faxed to locations and fax numbers as listed in PSR's note.

## 2021-10-15 NOTE — TELEPHONE ENCOUNTER
Pt calling to check if the paperwork he gave to Dr. Emiliano Stapleton at his appt on 10.7 has been faxed to his employer and his employer's [employer] company. Please fax to HR at [facility]  [phone] and then please fax to [facility].

## 2021-10-19 ENCOUNTER — ORDER TRANSCRIPTION (OUTPATIENT)
Dept: PHYSICAL THERAPY | Facility: HOSPITAL | Age: 46
End: 2021-10-19

## 2021-10-20 ENCOUNTER — TELEPHONE (OUTPATIENT)
Dept: SURGERY | Facility: CLINIC | Age: 46
End: 2021-10-20

## 2021-10-20 ENCOUNTER — TELEPHONE (OUTPATIENT)
Dept: PHYSICAL THERAPY | Facility: HOSPITAL | Age: 46
End: 2021-10-20

## 2021-10-20 ENCOUNTER — OFFICE VISIT (OUTPATIENT)
Dept: PHYSICAL THERAPY | Age: 46
End: 2021-10-20
Attending: NEUROLOGICAL SURGERY
Payer: COMMERCIAL

## 2021-10-20 DIAGNOSIS — G95.9 CERVICAL MYELOPATHY (HCC): ICD-10-CM

## 2021-10-20 DIAGNOSIS — Z98.1 S/P CERVICAL SPINAL FUSION: ICD-10-CM

## 2021-10-20 PROCEDURE — 97110 THERAPEUTIC EXERCISES: CPT

## 2021-10-20 PROCEDURE — 97162 PT EVAL MOD COMPLEX 30 MIN: CPT

## 2021-10-20 NOTE — TELEPHONE ENCOUNTER
Heri Ran returned call. She was wanting to discuss Keyboarding restriction. Informed her of message below. Nothing further needed.

## 2021-10-20 NOTE — TELEPHONE ENCOUNTER
Per last OV note on 10-6-21   1. I will not prescribe any new medications for the patient because of the drowsiness side effects.   He may take over-the-counter pain medications as needed.     2. I am going to send him for outpatient physical therapy to hel

## 2021-10-20 NOTE — TELEPHONE ENCOUNTER
Geoffrey Fregoso from [facility] calling re: Disability Paperwork. She wants to double check that pt indeed has a keyboarding restriction, or if this was an error. She states pt is a  and this is a big part of his job.   Pls call Jesus Collado to

## 2021-10-20 NOTE — TELEPHONE ENCOUNTER
Per Dr. Colin Rangel, \"I did the form under the direction of the patient, so it is accurate. \"    Called and spoke to pt regarding Keyboarding restriction.   Pt states he has not touched a keyboard since his surgery    Pt requests he be allowed frequent breaks,

## 2021-10-20 NOTE — PROGRESS NOTES
CERVICAL SPINE EVALUATION:   Referring Physician: Mechelle Han MD    Date of surgery: 8/17/21 Date of Service: 10/20/21   Diagnosis: cervical fusion, multilevel   SUBJECTIVE:   PATIENT SUMMARY:  Sandy Montanez is a 55year old y/o male who present exercises correctly. Vargas Esteban would benefit from skilled Physical Therapy to address the above impairments to return patient to PLOF and work duties without restrictions.      Precautions: None       OBJECTIVE:   Observation/Posture: protracted shoulders, for flexion by 20 degrees to improve tolerance for looking down to tie shoes   · Pt will improve cervical AROM extension by 20 degrees to improve tolerance for putting dishes into overhead cabinets   · Pt will improve cervical AROM rotation to >65 degrees to i via fax as soon as possible to [phone]. If you have any question please contact me at Dept: [phone].     Sincerely,  Electronically signed by therapist: Joe Wilkes, PT  Physician’s certification required: Yes  I certify the need for these servi

## 2021-10-26 ENCOUNTER — OFFICE VISIT (OUTPATIENT)
Dept: PHYSICAL THERAPY | Age: 46
End: 2021-10-26
Attending: NEUROLOGICAL SURGERY
Payer: COMMERCIAL

## 2021-10-26 DIAGNOSIS — G95.9 CERVICAL MYELOPATHY (HCC): ICD-10-CM

## 2021-10-26 DIAGNOSIS — Z98.1 S/P CERVICAL SPINAL FUSION: ICD-10-CM

## 2021-10-26 PROCEDURE — 97110 THERAPEUTIC EXERCISES: CPT

## 2021-10-26 PROCEDURE — 97140 MANUAL THERAPY 1/> REGIONS: CPT

## 2021-10-26 NOTE — PROGRESS NOTES
Dx: Cervical Fusion         Insurance (Authorized # of Visits):  6           Authorizing Physician: Dr. Izzy Farris MD visit: none scheduled  Fall Risk: standard         Precautions: n/a             Subjective: Pt report no significant changes.  The serafin throughout to 4/5 to improve function with lifting and carrying. · Pt will be independent and compliant with comprehensive HEP to maintain progress achieved in PT      Plan: Addcervical retractions, AAROM cane flexion, punches, shoulder isometrics.     Brooke Spence

## 2021-10-28 ENCOUNTER — OFFICE VISIT (OUTPATIENT)
Dept: PHYSICAL THERAPY | Age: 46
End: 2021-10-28
Attending: NEUROLOGICAL SURGERY
Payer: COMMERCIAL

## 2021-10-28 DIAGNOSIS — G95.9 CERVICAL MYELOPATHY (HCC): ICD-10-CM

## 2021-10-28 DIAGNOSIS — Z98.1 S/P CERVICAL SPINAL FUSION: ICD-10-CM

## 2021-10-28 PROCEDURE — 97110 THERAPEUTIC EXERCISES: CPT

## 2021-10-28 PROCEDURE — 97140 MANUAL THERAPY 1/> REGIONS: CPT

## 2021-10-28 NOTE — PROGRESS NOTES
Dx: Cervical Fusion         Insurance (Authorized # of Visits): Troy Mancilla 150 PPO, 61           Authorizing Physician: Dr. Selena Baeza  Next MD visit: none scheduled  Fall Risk: standard         Precautions: n/a             Subjective:  Pt reporting a 7/10 pain radiati shoulder flexion AROM to >150 degrees to be able to reach into overhead cabinets without pain or restriction   · Pt will improve shoulder strength throughout to 4/5 to improve function with lifting and carrying.    · Pt will be independent and compliant wit

## 2021-11-01 ENCOUNTER — OFFICE VISIT (OUTPATIENT)
Dept: PHYSICAL THERAPY | Age: 46
End: 2021-11-01
Attending: NEUROLOGICAL SURGERY
Payer: COMMERCIAL

## 2021-11-01 DIAGNOSIS — G95.9 CERVICAL MYELOPATHY (HCC): ICD-10-CM

## 2021-11-01 DIAGNOSIS — Z98.1 S/P CERVICAL SPINAL FUSION: ICD-10-CM

## 2021-11-01 PROCEDURE — 97110 THERAPEUTIC EXERCISES: CPT | Performed by: PHYSICAL THERAPIST

## 2021-11-01 PROCEDURE — 97140 MANUAL THERAPY 1/> REGIONS: CPT | Performed by: PHYSICAL THERAPIST

## 2021-11-01 NOTE — PROGRESS NOTES
Dx: Cervical Fusion         Insurance (Authorized # of Visits): Shira Monte PPO, 17           Authorizing Physician: Dr. Josef Barnett  Next MD visit: none scheduled  Fall Risk: standard         Precautions: n/a             Subjective:  Pt reporting a 3/10 pain due to overhead activities. · Pt will improve shoulder flexion AROM to >150 degrees to be able to reach into overhead cabinets without pain or restriction   · Pt will improve shoulder strength throughout to 4/5 to improve function with lifting and carrying.    ·

## 2021-11-04 ENCOUNTER — OFFICE VISIT (OUTPATIENT)
Dept: PHYSICAL THERAPY | Age: 46
End: 2021-11-04
Attending: NEUROLOGICAL SURGERY
Payer: COMMERCIAL

## 2021-11-04 DIAGNOSIS — Z98.1 S/P CERVICAL SPINAL FUSION: ICD-10-CM

## 2021-11-04 DIAGNOSIS — G95.9 CERVICAL MYELOPATHY (HCC): ICD-10-CM

## 2021-11-04 PROCEDURE — 97110 THERAPEUTIC EXERCISES: CPT | Performed by: PHYSICAL THERAPIST

## 2021-11-04 PROCEDURE — 97140 MANUAL THERAPY 1/> REGIONS: CPT | Performed by: PHYSICAL THERAPIST

## 2021-11-04 NOTE — PROGRESS NOTES
Dx: Cervical Fusion         Insurance (Authorized # of Visits): Troy Mancilla 150 PPO, 01           Authorizing Physician: Dr. Anup Garner  Next MD visit: none scheduled  Fall Risk: standard         Precautions: n/a             Subjective:  Pt reporting a 2-3/10 pain, just AROM to >150 degrees to be able to reach into overhead cabinets without pain or restriction   · Pt will improve shoulder strength throughout to 4/5 to improve function with lifting and carrying.    · Pt will be independent and compliant with comprehensive H ER:   -RTC iso: next visit  -shoulder ext:next visit  -rows:next visit  Wall wash:next visit           HEP: cervical AROM rotation, AROM flex/ext, scap retactions, shoulder rolls, and doorway pec stretch, supine: shoulder punches, alt diagonals, horiz abd,

## 2021-11-11 ENCOUNTER — APPOINTMENT (OUTPATIENT)
Dept: PHYSICAL THERAPY | Age: 46
End: 2021-11-11
Attending: NEUROLOGICAL SURGERY
Payer: COMMERCIAL

## 2021-11-11 ENCOUNTER — TELEPHONE (OUTPATIENT)
Dept: PHYSICAL THERAPY | Facility: HOSPITAL | Age: 46
End: 2021-11-11

## 2021-11-17 ENCOUNTER — TELEPHONE (OUTPATIENT)
Dept: SURGERY | Facility: CLINIC | Age: 46
End: 2021-11-17

## 2021-11-17 ENCOUNTER — TELEMEDICINE (OUTPATIENT)
Dept: SURGERY | Facility: CLINIC | Age: 46
End: 2021-11-17
Payer: COMMERCIAL

## 2021-11-17 DIAGNOSIS — Z98.1 S/P CERVICAL SPINAL FUSION: Primary | ICD-10-CM

## 2021-11-17 PROCEDURE — 99212 OFFICE O/P EST SF 10 MIN: CPT | Performed by: NEUROLOGICAL SURGERY

## 2021-11-17 RX ORDER — HYDROCODONE BITARTRATE AND ACETAMINOPHEN 10; 325 MG/1; MG/1
1-2 TABLET ORAL
Qty: 60 TABLET | Refills: 0 | Status: SHIPPED | OUTPATIENT
Start: 2021-11-17

## 2021-11-17 RX ORDER — DIAZEPAM 10 MG/1
10 TABLET ORAL EVERY 8 HOURS PRN
Qty: 60 TABLET | Refills: 0 | Status: SHIPPED | OUTPATIENT
Start: 2021-11-17

## 2021-11-17 NOTE — PROGRESS NOTES
[facility]  Neurological Surgery Telemedicine Clinic Note    Name: Sherie Faulkner verbally consents to a virtual/telephone check-in service on 11/17/2021.   Patient understands and accepts financial responsibility for any deductabile, of alcohol per week. He reports that he does not use drugs. ALLERGIES:  No Known Allergies    MEDICATIONS:  (Not in a hospital admission)    No current facility-administered medications for this visit. DIAGNOSIS:  1.  S/P cervical spinal fusion

## 2021-11-18 ENCOUNTER — TELEPHONE (OUTPATIENT)
Dept: PHYSICAL THERAPY | Facility: HOSPITAL | Age: 46
End: 2021-11-18

## 2021-11-18 ENCOUNTER — APPOINTMENT (OUTPATIENT)
Dept: PHYSICAL THERAPY | Age: 46
End: 2021-11-18
Attending: NEUROLOGICAL SURGERY
Payer: COMMERCIAL

## 2021-11-22 ENCOUNTER — OFFICE VISIT (OUTPATIENT)
Dept: PHYSICAL THERAPY | Age: 46
End: 2021-11-22
Attending: NEUROLOGICAL SURGERY
Payer: COMMERCIAL

## 2021-11-22 DIAGNOSIS — Z98.1 S/P CERVICAL SPINAL FUSION: ICD-10-CM

## 2021-11-22 DIAGNOSIS — G95.9 CERVICAL MYELOPATHY (HCC): ICD-10-CM

## 2021-11-22 PROCEDURE — 97110 THERAPEUTIC EXERCISES: CPT | Performed by: PHYSICAL THERAPIST

## 2021-11-22 PROCEDURE — 97140 MANUAL THERAPY 1/> REGIONS: CPT | Performed by: PHYSICAL THERAPIST

## 2021-11-22 NOTE — PROGRESS NOTES
[place]  Pt has attended 6 visits in Physical Therapy.    Dx: Cervical Fusion         Insurance (Authorized # of Visits): Son[employer] Han PPO, 75           Authorizing Physician: Dr. Racheal Farris MD visit: none scheduled  Fall Risk: standard         Precau been discharge at this time.        Goals:   ·  Pt will improve cervical AROM flexion by 20 degrees to improve tolerance for looking down to tie shoes-Met   · Pt will improve cervical AROM extension by 20 degrees to improve tolerance for putting dishes into care.    GILDA___________________________________________________ Date____________________    Certification From: 67/25/2967  To:2/20/2022   Date: 10/26/2021  TX#: 2/24 Date:  10/28/21     TX#: 3/24 Date: 11/1/2021         TX#: 4/24 Date:11/4/21 2x10  -supine alternating diagonals: RTB, 2x10  -deltoid iso:10x10 sec  -S/l shoulder abd: 2x10, RTB  -supine shoulder circles: RTB, 3x10  -supine horiz shoulder abd: 3x10, GTB  -Rows: 2x10, RTB  -shoulder ext: 2x10, RTB  -tricep ext: 2x10, RTB  -I, Y, T's

## 2021-11-24 ENCOUNTER — APPOINTMENT (OUTPATIENT)
Dept: PHYSICAL THERAPY | Age: 46
End: 2021-11-24
Attending: NEUROLOGICAL SURGERY
Payer: COMMERCIAL

## 2021-12-02 ENCOUNTER — APPOINTMENT (OUTPATIENT)
Dept: PHYSICAL THERAPY | Age: 46
End: 2021-12-02
Attending: NEUROLOGICAL SURGERY
Payer: COMMERCIAL

## 2021-12-06 ENCOUNTER — APPOINTMENT (OUTPATIENT)
Dept: PHYSICAL THERAPY | Age: 46
End: 2021-12-06
Attending: NEUROLOGICAL SURGERY
Payer: COMMERCIAL

## 2021-12-09 ENCOUNTER — APPOINTMENT (OUTPATIENT)
Dept: PHYSICAL THERAPY | Age: 46
End: 2021-12-09
Attending: NEUROLOGICAL SURGERY
Payer: COMMERCIAL

## 2021-12-13 ENCOUNTER — APPOINTMENT (OUTPATIENT)
Dept: PHYSICAL THERAPY | Age: 46
End: 2021-12-13
Attending: NEUROLOGICAL SURGERY
Payer: COMMERCIAL

## 2022-03-03 ENCOUNTER — TELEPHONE (OUTPATIENT)
Dept: INTERNAL MEDICINE CLINIC | Facility: CLINIC | Age: 47
End: 2022-03-03

## 2022-03-03 NOTE — TELEPHONE ENCOUNTER
Pt c/o elevated BP for the last 2-3 weeks. Reports BP's taken around 10am this morning were 160/115 and 172/120. Patient reports occasional SOB, headaches, and vision changes. Denies chest pain, weakness, numbness/tingling, lightheadedness/dizziness. [place] and is requesting BP medication. Advised patient that BP medication cannot be prescribed without evaluating the pt in office first to determine best course of treatment. Patient asking if VV can be done, advised patient that VV cannot be done if the patient is out of the state. Advised patient to be seen/treated in local UC/ER d/t hypertension, SOB, and headaches. Patient requesting call from  directly. Made patient aware that message will be sent, however, cannot guarantee that  will contact the patient.

## 2022-03-03 NOTE — TELEPHONE ENCOUNTER
Message left for patient unable to reach patient voice message was left that he should seek medical attention wherever he is in view of his elevated blood pressure

## 2022-03-21 ENCOUNTER — TELEPHONE (OUTPATIENT)
Dept: SURGERY | Facility: CLINIC | Age: 47
End: 2022-03-21

## 2022-03-21 NOTE — TELEPHONE ENCOUNTER
Received medical records request from [facility] from 10/26/2019 to Present. Original sent to SCAN/STAT, copy sent to scanning.

## 2022-08-29 ENCOUNTER — TELEPHONE (OUTPATIENT)
Dept: INTERNAL MEDICINE CLINIC | Facility: CLINIC | Age: 47
End: 2022-08-29

## 2022-08-29 ENCOUNTER — TELEPHONE (OUTPATIENT)
Dept: NEUROLOGY | Facility: CLINIC | Age: 47
End: 2022-08-29

## 2022-08-29 DIAGNOSIS — G95.9 CERVICAL MYELOPATHY (HCC): ICD-10-CM

## 2022-08-29 DIAGNOSIS — Z98.1 STATUS POST CERVICAL SPINAL FUSION: Primary | ICD-10-CM

## 2022-08-29 NOTE — TELEPHONE ENCOUNTER
Pt requesting referral for neurosurgery because his previous specialist is leaving the practice.     Dr. Maeve Carballo  Surgeon  [address] [place], [place]  Phone: [phone]    Pt also needs a referral for rehab center  [facility]      [address], [place], [address] [facility]  [phone]

## 2022-08-29 NOTE — TELEPHONE ENCOUNTER
past pt of Dr Cleven Castleman and Dr Pinedo Necessary; pt has recently moved to [place] and needs a referral to Dr Dory Conn, ph [phone] and [facility] Fax [phone]; pt states they will not see him without referral; pls advise

## 2022-08-29 NOTE — TELEPHONE ENCOUNTER
Called pt to inform that if a referral is required for a new specialty physician, the pt would have to contact a primary care provider for the referral.     Pt acknowledged. Pt was informed that Dr. Pedraza is no longer a physician at this practice. If continuance of care at this facility is desired, then the patient would have to be re-established with one of the other providers. Informed pt that continuing care with Dr. Pedraza at another facility was also an option for him.  Pt acknowledged,    Pt states he will reach out to PCP [facility] for referral

## 2022-08-30 ENCOUNTER — PATIENT MESSAGE (OUTPATIENT)
Dept: INTERNAL MEDICINE CLINIC | Facility: CLINIC | Age: 47
End: 2022-08-30

## 2022-08-30 ENCOUNTER — TELEPHONE (OUTPATIENT)
Dept: ADMINISTRATIVE | Age: 47
End: 2022-08-30

## 2022-08-30 NOTE — TELEPHONE ENCOUNTER
Notified by referral department that both referrals for [place] have been authorized for tracking purposes. [facility] sent to pt.

## 2022-08-30 NOTE — TELEPHONE ENCOUNTER
VM LOV 8/3/21 - pt recently moved to [facility] and is requesting referral. OK to place?  Please advise, ty

## 2022-08-30 NOTE — TELEPHONE ENCOUNTER
Apparently his referrals need to be  completed because will not let me sign off.   Please let the patient know I will sign it but he needs to establish a primary care physician in [place]

## 2022-08-30 NOTE — TELEPHONE ENCOUNTER
VM, pended both referrals, if appropriate - pt insists his insurance was no help and told him to get referrals. please advise. ty    [phone] spoke to pt who states he was told by these two specialist locations in [place] that it is their policy to require referrals. Pt has also tried to talk to [facility] PPO and they also told him to contact his PCP for referrals to be sent.

## 2022-08-30 NOTE — TELEPHONE ENCOUNTER
Made a correction to one referral and was able to get them to go through. Notified referral department to assist with getting these approved, if possible. Sent MCM to pt.

## 2022-08-30 NOTE — TELEPHONE ENCOUNTER
Patient is a PPO and does not need a referral from us. His last visit with us was in August 2021.   Referrals only needed for HMO patients

## 2022-10-18 ENCOUNTER — TELEPHONE (OUTPATIENT)
Dept: NEUROLOGY | Facility: CLINIC | Age: 47
End: 2022-10-18

## 2022-10-18 NOTE — TELEPHONE ENCOUNTER
Pt calling requesting [facility] paperwork. Pt is a former pt of Dr. Apoorva Clay and Dr. Jeanna Quinonez and would like to know how to proceed. Pt states he still has numbness, weakness, atrophy but not pain. He states the surgery has helped with pain but is still having numbness but was told this may take up to two years to go back to normal. Pt states the paperwork is the same as last year but needs the paperwork completed by November 18th.

## 2022-10-18 NOTE — TELEPHONE ENCOUNTER
Patient needs to establish care with a new provider. Please call patient and set up an apt. Thank you.

## 2022-10-19 NOTE — TELEPHONE ENCOUNTER
Patient had seen Dr. Jada Deleon and Dr. Justin Arzate in the past.  Pt is still having some issues from surgery and when he get a flair up he can not work. Patient would like to have Intermittent FMLA paperwork filled out. It was indicated to patient that he would need to come in and reestablish with one of the providers here. Patient wants to know if he can do Televisit with one of the neurosurgeons to get paperwork filled out. It was suggested that provider will want to see him and re evaluate his condition. Patient feels like he comes in and just gets questioned without any exam and doesn't feel it is necessary. Please advise if patient can have televisit to get paperwork filled out.

## 2022-10-19 NOTE — TELEPHONE ENCOUNTER
Spoke with patient and informed him that [employer] policy will not allow for providers to continue to fill out FMLA paperwork a year an a half out from surgery. Discussed the option of patient contacting Dr. Elias Medrano or Dr. Indira Baer at their new clinics, or that he may seek assistance with this request from PCP. Patient acknowledged and the call was ended.

## 2022-11-09 ENCOUNTER — APPOINTMENT (RX ONLY)
Dept: URBAN - METROPOLITAN AREA CLINIC 93 | Facility: CLINIC | Age: 47
Setting detail: DERMATOLOGY
End: 2022-11-09

## 2022-11-09 DIAGNOSIS — D22 MELANOCYTIC NEVI: ICD-10-CM

## 2022-11-09 DIAGNOSIS — L81.4 OTHER MELANIN HYPERPIGMENTATION: ICD-10-CM

## 2022-11-09 DIAGNOSIS — L70.0 ACNE VULGARIS: ICD-10-CM

## 2022-11-09 DIAGNOSIS — Z71.89 OTHER SPECIFIED COUNSELING: ICD-10-CM

## 2022-11-09 PROBLEM — D22.72 MELANOCYTIC NEVI OF LEFT LOWER LIMB, INCLUDING HIP: Status: ACTIVE | Noted: 2022-11-09

## 2022-11-09 PROBLEM — D22.62 MELANOCYTIC NEVI OF LEFT UPPER LIMB, INCLUDING SHOULDER: Status: ACTIVE | Noted: 2022-11-09

## 2022-11-09 PROCEDURE — ? COUNSELING

## 2022-11-09 PROCEDURE — 99203 OFFICE O/P NEW LOW 30 MIN: CPT

## 2022-11-09 PROCEDURE — ? TREATMENT REGIMEN

## 2022-11-09 ASSESSMENT — LOCATION SIMPLE DESCRIPTION DERM
LOCATION SIMPLE: LEFT CALF
LOCATION SIMPLE: LEFT UPPER ARM
LOCATION SIMPLE: RIGHT CHEEK
LOCATION SIMPLE: RIGHT FOREHEAD

## 2022-11-09 ASSESSMENT — LOCATION DETAILED DESCRIPTION DERM
LOCATION DETAILED: RIGHT CENTRAL MALAR CHEEK
LOCATION DETAILED: LEFT ANTERIOR DISTAL UPPER ARM
LOCATION DETAILED: RIGHT SUPERIOR LATERAL MALAR CHEEK
LOCATION DETAILED: RIGHT LATERAL FOREHEAD
LOCATION DETAILED: LEFT PROXIMAL LATERAL CALF

## 2022-11-09 ASSESSMENT — LOCATION ZONE DERM
LOCATION ZONE: FACE
LOCATION ZONE: LEG
LOCATION ZONE: ARM

## 2022-11-09 NOTE — PROCEDURE: COUNSELING
Detail Level: Simple
Detail Level: Generalized
Topical Retinoid Pregnancy And Lactation Text: This medication is Pregnancy Category C. It is unknown if this medication is excreted in breast milk.
Winlevi Counseling:  I discussed with the patient the risks of topical clascoterone including but not limited to erythema, scaling, itching, and stinging. Patient voiced their understanding.
Azithromycin Pregnancy And Lactation Text: This medication is considered safe during pregnancy and is also secreted in breast milk.
Doxycycline Counseling:  Patient counseled regarding possible photosensitivity and increased risk for sunburn.  Patient instructed to avoid sunlight, if possible.  When exposed to sunlight, patients should wear protective clothing, sunglasses, and sunscreen.  The patient was instructed to call the office immediately if the following severe adverse effects occur:  hearing changes, easy bruising/bleeding, severe headache, or vision changes.  The patient verbalized understanding of the proper use and possible adverse effects of doxycycline.  All of the patient's questions and concerns were addressed.
High Dose Vitamin A Pregnancy And Lactation Text: High dose vitamin A therapy is contraindicated during pregnancy and breast feeding.
Dapsone Counseling: I discussed with the patient the risks of dapsone including but not limited to hemolytic anemia, agranulocytosis, rashes, methemoglobinemia, kidney failure, peripheral neuropathy, headaches, GI upset, and liver toxicity.  Patients who start dapsone require monitoring including baseline LFTs and weekly CBCs for the first month, then every month thereafter.  The patient verbalized understanding of the proper use and possible adverse effects of dapsone.  All of the patient's questions and concerns were addressed.
Isotretinoin Pregnancy And Lactation Text: This medication is Pregnancy Category X and is considered extremely dangerous during pregnancy. It is unknown if it is excreted in breast milk.
Spironolactone Counseling: Patient advised regarding risks of diarrhea, abdominal pain, hyperkalemia, birth defects (for female patients), liver toxicity and renal toxicity. The patient may need blood work to monitor liver and kidney function and potassium levels while on therapy. The patient verbalized understanding of the proper use and possible adverse effects of spironolactone.  All of the patient's questions and concerns were addressed.
Topical Sulfur Applications Counseling: Topical Sulfur Counseling: Patient counseled that this medication may cause skin irritation or allergic reactions.  In the event of skin irritation, the patient was advised to reduce the amount of the drug applied or use it less frequently.   The patient verbalized understanding of the proper use and possible adverse effects of topical sulfur application.  All of the patient's questions and concerns were addressed.
Benzoyl Peroxide Pregnancy And Lactation Text: This medication is Pregnancy Category C. It is unknown if benzoyl peroxide is excreted in breast milk.
Tetracycline Counseling: Patient counseled regarding possible photosensitivity and increased risk for sunburn.  Patient instructed to avoid sunlight, if possible.  When exposed to sunlight, patients should wear protective clothing, sunglasses, and sunscreen.  The patient was instructed to call the office immediately if the following severe adverse effects occur:  hearing changes, easy bruising/bleeding, severe headache, or vision changes.  The patient verbalized understanding of the proper use and possible adverse effects of tetracycline.  All of the patient's questions and concerns were addressed. Patient understands to avoid pregnancy while on therapy due to potential birth defects.
Use Enhanced Medication Counseling?: No
Aklief Pregnancy And Lactation Text: It is unknown if this medication is safe to use during pregnancy.  It is unknown if this medication is excreted in breast milk.  Breastfeeding women should use the topical cream on the smallest area of the skin for the shortest time needed while breastfeeding.  Do not apply to nipple and areola.
Topical Clindamycin Counseling: Patient counseled that this medication may cause skin irritation or allergic reactions.  In the event of skin irritation, the patient was advised to reduce the amount of the drug applied or use it less frequently.   The patient verbalized understanding of the proper use and possible adverse effects of clindamycin.  All of the patient's questions and concerns were addressed.
Birth Control Pills Counseling: Birth Control Pill Counseling: I discussed with the patient the potential side effects of OCPs including but not limited to increased risk of stroke, heart attack, thrombophlebitis, deep venous thrombosis, hepatic adenomas, breast changes, GI upset, headaches, and depression.  The patient verbalized understanding of the proper use and possible adverse effects of OCPs. All of the patient's questions and concerns were addressed.
Doxycycline Pregnancy And Lactation Text: This medication is Pregnancy Category D and not consider safe during pregnancy. It is also excreted in breast milk but is considered safe for shorter treatment courses.
Bactrim Counseling:  I discussed with the patient the risks of sulfa antibiotics including but not limited to GI upset, allergic reaction, drug rash, diarrhea, dizziness, photosensitivity, and yeast infections.  Rarely, more serious reactions can occur including but not limited to aplastic anemia, agranulocytosis, methemoglobinemia, blood dyscrasias, liver or kidney failure, lung infiltrates or desquamative/blistering drug rashes.
Tazorac Counseling:  Patient advised that medication is irritating and drying.  Patient may need to apply sparingly and wash off after an hour before eventually leaving it on overnight.  The patient verbalized understanding of the proper use and possible adverse effects of tazorac.  All of the patient's questions and concerns were addressed.
Winlevi Pregnancy And Lactation Text: This medication is considered safe during pregnancy and breastfeeding.
Azelaic Acid Pregnancy And Lactation Text: This medication is considered safe during pregnancy and breast feeding.
Erythromycin Pregnancy And Lactation Text: This medication is Pregnancy Category B and is considered safe during pregnancy. It is also excreted in breast milk.
Sarecycline Counseling: Patient advised regarding possible photosensitivity and discoloration of the teeth, skin, lips, tongue and gums.  Patient instructed to avoid sunlight, if possible.  When exposed to sunlight, patients should wear protective clothing, sunglasses, and sunscreen.  The patient was instructed to call the office immediately if the following severe adverse effects occur:  hearing changes, easy bruising/bleeding, severe headache, or vision changes.  The patient verbalized understanding of the proper use and possible adverse effects of sarecycline.  All of the patient's questions and concerns were addressed.
Topical Retinoid counseling:  Patient advised to apply a pea-sized amount only at bedtime and wait 30 minutes after washing their face before applying.  If too drying, patient may add a non-comedogenic moisturizer. The patient verbalized understanding of the proper use and possible adverse effects of retinoids.  All of the patient's questions and concerns were addressed.
Minocycline Counseling: Patient advised regarding possible photosensitivity and discoloration of the teeth, skin, lips, tongue and gums.  Patient instructed to avoid sunlight, if possible.  When exposed to sunlight, patients should wear protective clothing, sunglasses, and sunscreen.  The patient was instructed to call the office immediately if the following severe adverse effects occur:  hearing changes, easy bruising/bleeding, severe headache, or vision changes.  The patient verbalized understanding of the proper use and possible adverse effects of minocycline.  All of the patient's questions and concerns were addressed.
Tetracycline Pregnancy And Lactation Text: This medication is Pregnancy Category D and not consider safe during pregnancy. It is also excreted in breast milk.
Benzoyl Peroxide Counseling: Patient counseled that medicine may cause skin irritation and bleach clothing.  In the event of skin irritation, the patient was advised to reduce the amount of the drug applied or use it less frequently.   The patient verbalized understanding of the proper use and possible adverse effects of benzoyl peroxide.  All of the patient's questions and concerns were addressed.
Spironolactone Pregnancy And Lactation Text: This medication can cause feminization of the male fetus and should be avoided during pregnancy. The active metabolite is also found in breast milk.
Dapsone Pregnancy And Lactation Text: This medication is Pregnancy Category C and is not considered safe during pregnancy or breast feeding.
Azithromycin Counseling:  I discussed with the patient the risks of azithromycin including but not limited to GI upset, allergic reaction, drug rash, diarrhea, and yeast infections.
High Dose Vitamin A Counseling: Side effects reviewed, pt to contact office should one occur.
Topical Sulfur Applications Pregnancy And Lactation Text: This medication is Pregnancy Category C and has an unknown safety profile during pregnancy. It is unknown if this topical medication is excreted in breast milk.
Topical Clindamycin Pregnancy And Lactation Text: This medication is Pregnancy Category B and is considered safe during pregnancy. It is unknown if it is excreted in breast milk.
Azelaic Acid Counseling: Patient counseled that medicine may cause skin irritation and to avoid applying near the eyes.  In the event of skin irritation, the patient was advised to reduce the amount of the drug applied or use it less frequently.   The patient verbalized understanding of the proper use and possible adverse effects of azelaic acid.  All of the patient's questions and concerns were addressed.
Birth Control Pills Pregnancy And Lactation Text: This medication should be avoided if pregnant and for the first 30 days post-partum.
Isotretinoin Counseling: Patient should get monthly blood tests, not donate blood, not drive at night if vision affected, not share medication, and not undergo elective surgery for 6 months after tx completed. Side effects reviewed, pt to contact office should one occur.
Aklief counseling:  Patient advised to apply a pea-sized amount only at bedtime and wait 30 minutes after washing their face before applying.  If too drying, patient may add a non-comedogenic moisturizer.  The most commonly reported side effects including irritation, redness, scaling, dryness, stinging, burning, itching, and increased risk of sunburn.  The patient verbalized understanding of the proper use and possible adverse effects of retinoids.  All of the patient's questions and concerns were addressed.
Tazorac Pregnancy And Lactation Text: This medication is not safe during pregnancy. It is unknown if this medication is excreted in breast milk.
Erythromycin Counseling:  I discussed with the patient the risks of erythromycin including but not limited to GI upset, allergic reaction, drug rash, diarrhea, increase in liver enzymes, and yeast infections.
Bactrim Pregnancy And Lactation Text: This medication is Pregnancy Category D and is known to cause fetal risk.  It is also excreted in breast milk.

## 2023-07-05 NOTE — LETTER
[facility]  Phone: [phone]  OUTSIDE TESTING RESULT REQUEST      TO:  Dr. Alejandra Han and staff   Today's Date: 1/14/21    FAX #: [phone]     IMPORTANT: FOR YOUR IMMEDIATE ATTENTION  Please FAX all test results listed be Bcc Histology Text: There were numerous aggregates of basaloid cells.

## 2024-11-16 NOTE — TELEPHONE ENCOUNTER
Medication: Norco:    HYDROcodone-acetaminophen  MG Oral Tab 40 tablet 0 6/1/2021    Sig:   Take 1 tablet by mouth every 8 (eight) hours as needed.      Route: Darlyn Dean Date:   6/1/2021       Date of last refill: 6/1/21  Date last fill Opt out

## (undated) DEVICE — LAMINECTOMY CDS: Brand: MEDLINE INDUSTRIES, INC.

## (undated) DEVICE — ALCOHOL 70% 4 OZ

## (undated) DEVICE — DRAPE,THYROID,SOFT,STERILE: Brand: MEDLINE

## (undated) DEVICE — SOL  .9 1000ML BTL

## (undated) DEVICE — DRAIN RELIAVAC 100CC

## (undated) DEVICE — 3M(TM) MEDIPORE(TM) +PAD SOFT CLOTH ADHESIVE WOUND DRESSING 3569: Brand: 3M™ MEDIPORE™

## (undated) DEVICE — KENDALL SCD EXPRESS SLEEVES, KNEE LENGTH, MEDIUM: Brand: KENDALL SCD

## (undated) DEVICE — DERMABOND LIQUID ADHESIVE

## (undated) DEVICE — FLOSEAL HEMOSTATIC MATRIX, 5ML: Brand: FLOSEAL HEMOSTATIC MATRIX

## (undated) DEVICE — GOWN,SIRUS,FABRIC-REINFORCED,X-LARGE: Brand: MEDLINE

## (undated) DEVICE — SUTURE VICRYL 3-0 RB-1

## (undated) DEVICE — STERILE POLYISOPRENE POWDER-FREE SURGICAL GLOVES: Brand: PROTEXIS

## (undated) DEVICE — SCD SLEEVE KNEE HI BLEND

## (undated) DEVICE — LIGHT HANDLE

## (undated) DEVICE — C-ARMOR C-ARM EQUIPMENT COVERS CLEAR STERILE UNIVERSAL FIT 12 PER CASE: Brand: C-ARMOR

## (undated) DEVICE — Device: Brand: INTELLICART™

## (undated) DEVICE — SUTURE VICRYL 0 CT-1

## (undated) DEVICE — PREMIUM WET SKIN PREP TRAY: Brand: MEDLINE INDUSTRIES, INC.

## (undated) DEVICE — TZ MEDICAL TITANIUM DISTRACTION PINS 14MM: Brand: TZ MEDICAL TITANIUM DISTRACTION PINS

## (undated) DEVICE — PAD SACRAL SPAN AID

## (undated) DEVICE — DRAIN CHANNEL 10FR BLAKE

## (undated) DEVICE — GAUZE SPONGES,12 PLY: Brand: CURITY

## (undated) DEVICE — DRILL SRG OIL CRTDG MAESTRO

## (undated) DEVICE — 3.0MM PRECISION NEURO (MATCH HEAD)

## (undated) DEVICE — DRILL NEURO SOFT TOUCH 3.0X3.8

## (undated) DEVICE — C-ARM: Brand: UNBRANDED

## (undated) DEVICE — SUTURE VICRYL 2-0 CT-2

## (undated) DEVICE — PROXIMATE SKIN STAPLERS (35 WIDE) CONTAINS 35 STAINLESS STEEL STAPLES (FIXED HEAD): Brand: PROXIMATE

## (undated) DEVICE — DRAPE TABLE COVER 44X90 TC-10

## (undated) DEVICE — CAUTERY BLADE 2IN INS E1455

## (undated) DEVICE — DRILL BIT: Brand: AVIATOR

## (undated) DEVICE — MARKER SKIN PREP RESIST STRL

## (undated) DEVICE — 3M(TM) MEDIPORE(TM) +PAD SOFT CLOTH ADHESIVE WOUND DRESSING 3566: Brand: 3M™ MEDIPORE™

## (undated) DEVICE — 11.1-MULTIMODALITY IOM KIT

## (undated) DEVICE — SOLUTION SURG DURA PREP HAZMAT

## (undated) DEVICE — 3.0MM NEURO (MATCH HEAD) SOFT TOUCH

## (undated) DEVICE — #15 STERILE STAINLESS BLADE: Brand: STERILE STAINLESS BLADES

## (undated) DEVICE — EXOFIN TISSUE ADHESIVE 1.0ML

## (undated) DEVICE — VIOLET BRAIDED (POLYGLACTIN 910), SYNTHETIC ABSORBABLE SUTURE: Brand: COATED VICRYL

## (undated) DEVICE — DRILL BIT: Brand: ESCALATE

## (undated) DEVICE — SUTURE VICRYL 3-0 SH

## (undated) DEVICE — MAYFIELD® DISPOSABLE ADULT SKULL PIN (PLASTIC BASE): Brand: MAYFIELD®

## (undated) NOTE — LETTER
Date: 10/28/2020    Patient Name: Britney Vincent          To Whom it may concern: This letter has been written at the patient's request. The above patient was seen at the [facility] for treatment of a medical condition.     This patient

## (undated) NOTE — LETTER
Coronavirus Disease 2019 (COVID-19)     [facility] is committed to the safety and well-being of our patients, members, employees, and communities.  As concerns arise about the new strain of coronavirus that causes COVID-19, [facility] 4. If you have a medical appointment, call the healthcare provider ahead of time and tell them that you have or may have COVID-19.  5. For medical emergencies, call 911 and notify the dispatch personnel that you have or may have COVID-19.   6. Cover your c · At least 10 days have passed since symptoms first appeared OR if asymptomatic patient or date of symptom onset is unclear then use 10 days post COVID test date.    · At least 20 days have passed for severe illness (requiring hospitalization) OR if you are *Some people will be required to have a repeat COVID-19 test in order to be eligible to donate. If you’re instructed by Neha Ham that a repeat test is required, please contact the [facility] COVID-19 Nurse Triage Line at [phone].     Additional Inf

## (undated) NOTE — LETTER
04/28/21    Serge Carcamo   2/10/1975      To Whom It May Concern:    Mr. Latonya Thomas was seen in our office on 3/24/21. Work status is as follows:    x   Off work until 6/5/21 and to be re-evaluated at next appointment on 5/5/2.  ?    Off work, temporar

## (undated) NOTE — LETTER
OUTSIDE TESTING RESULT REQUEST     IMPORTANT: FOR YOUR IMMEDIATE ATTENTION  Please FAX all test results listed below to: [phone]       * * * * If testing is NOT complete, arrange with patient A.S.A.P. * * * *      Patient Name: Chemo Hernandez

## (undated) NOTE — LETTER
04/27/21    Durward Conception   2/10/1975      To Whom It May Concern:    Mr. Edwardo Uribe was seen in our office on 3/24/21. Work status is as follows:    x   Off work until 6/5/21 and to be re-evaluated at next appointment on 5/5/2.  ?    Off work, temporar

## (undated) NOTE — LETTER
Date: 5/5/2021    Patient Name: Jb Kim          To Whom it may concern: This letter has been written at the patient's request. The above patient was seen at the [facility] for treatment of a medical condition.     This patient s

## (undated) NOTE — LETTER
Date: 3/24/2021    Patient Name: Leandro Lambert          To Whom it may concern: This letter has been written at the patient's request. The above patient was seen at the [facility] for treatment of a medical condition.     This patient

## (undated) NOTE — LETTER
Patient Name: Kennedi North  : 2/10/1975  MRN: [id number]  Patient Address: [address] [place] [id number]      Coronavirus Disease 2019 ([id number])     [facility] is committed to the safety and well-being of our patients symptoms carefully. If your symptoms get worse, call your healthcare provider immediately. 3. Get rest and stay hydrated. 4. If you have a medical appointment, call the healthcare provider ahead of time and tell them that you have or may have COVID-19. without the use of fever-reducing medications; and  · Improvement in respiratory symptoms (e.g., cough, shortness of breath); and  · At least 10 days have passed since symptoms first appeared OR if asymptomatic patient or date of symptom onset is unclear t convalescent plasma donors must:    · Have had a confirmed diagnosis of COVID-19  · Be symptom-free for at least 14 days*    *Some people will be required to have a repeat COVID-19 test in order to be eligible to donate.  If you’re instructed by Tami woodward Post-COVID conditions to be random. Researchers are trying to identify similarities between people with a Post-COVID condition to better understand if there are risk factors. How do I prevent a Post-COVID condition?   The best way to prevent the long-t

## (undated) NOTE — ED AVS SNAPSHOT
[facility] in [address] [place] [address],[address]    [address] [facility]    Phone:  [phone]    Fax:  [phone]   MRN: [id number]    Department:  [facility] END   Date of Visit:  1/18/2017 Take 1 tablet (10 mg total) by mouth nightly as needed for Muscle spasms.    What changed:    - medication strength  - how much to take  - when to take this            Where to Get Your Medications      These medications were sent to [id number].  See your doctor in 3 days for any new or worsening symptoms otherwise in 1 week for reassessment and possible evaluation for physical therapy.       Discharge References/Attachments     DEGENERATIVE DISK DISEASE (ENGLISH)      Disclosure     [facility]. Follow-up care is at the discretion of that Physician. IF THERE IS ANY CHANGE OR WORSENING OF YOUR CONDITION, CALL YOUR PRIMARY CARE PHYSICIAN AT ONCE OR GO TO THE EMERGENCY DEPARTMENT.     If you have been prescribed any medication(s), - If you don’t have insurance, [facility] has partnered with [facility] to help you get signed up for insurance coverage.   [facility] is a [employer] program that can help with your Affordable Care Act cover

## (undated) NOTE — LETTER
21        RE: Zuhair Ulrich     : 2/10/1975    Dear Dr. Jerelene Nageotte,    This letter is to inform you that your patient is being scheduled for surgery with Dr.'s Blake Jiménez and Dr. Abdon Palma on 21 at [facility]. We have asked the p

## (undated) NOTE — LETTER
21      RE: Vicenta Cornejo     : 2/10/1975    Dear Dr. Alxey Salgado,    This letter is to inform you that your patient is being scheduled for surgery with Dr. Mikayla Gupta on 2021 at [facility]. We have asked the patient to contact your office

## (undated) NOTE — LETTER
Date: 11/26/2019    Patient Name: Fior Mancera          To Whom it may concern: The patient has been followed for a chronic medical condition by Dr. Cole Roca.  His physician is out of the office and cannot address his [place] paperwork until the first w